# Patient Record
Sex: FEMALE | Race: WHITE | NOT HISPANIC OR LATINO | Employment: OTHER | URBAN - METROPOLITAN AREA
[De-identification: names, ages, dates, MRNs, and addresses within clinical notes are randomized per-mention and may not be internally consistent; named-entity substitution may affect disease eponyms.]

---

## 2018-04-10 ENCOUNTER — OFFICE VISIT (OUTPATIENT)
Dept: INTERNAL MEDICINE | Facility: CLINIC | Age: 83
End: 2018-04-10
Payer: MEDICARE

## 2018-04-10 VITALS
HEIGHT: 63 IN | SYSTOLIC BLOOD PRESSURE: 116 MMHG | DIASTOLIC BLOOD PRESSURE: 70 MMHG | TEMPERATURE: 99 F | OXYGEN SATURATION: 97 % | WEIGHT: 102 LBS | BODY MASS INDEX: 18.07 KG/M2 | HEART RATE: 68 BPM | RESPIRATION RATE: 18 BRPM

## 2018-04-10 DIAGNOSIS — M40.14 OTHER SECONDARY KYPHOSIS, THORACIC REGION: Primary | ICD-10-CM

## 2018-04-10 DIAGNOSIS — I70.90 ATHEROSCLEROSIS: ICD-10-CM

## 2018-04-10 DIAGNOSIS — Z13.220 SCREENING FOR HYPERLIPIDEMIA: ICD-10-CM

## 2018-04-10 DIAGNOSIS — R41.3 MEMORY LOSS: ICD-10-CM

## 2018-04-10 DIAGNOSIS — R19.7 DIARRHEA, UNSPECIFIED TYPE: ICD-10-CM

## 2018-04-10 DIAGNOSIS — Z85.038 HISTORY OF COLON CANCER: ICD-10-CM

## 2018-04-10 DIAGNOSIS — M16.11 PRIMARY OSTEOARTHRITIS OF RIGHT HIP: ICD-10-CM

## 2018-04-10 DIAGNOSIS — Z87.19 HISTORY OF INTESTINAL OBSTRUCTION: ICD-10-CM

## 2018-04-10 DIAGNOSIS — M40.204 KYPHOSIS OF THORACIC REGION, UNSPECIFIED KYPHOSIS TYPE: ICD-10-CM

## 2018-04-10 DIAGNOSIS — N39.45 CONTINUOUS LEAKAGE OF URINE: ICD-10-CM

## 2018-04-10 PROCEDURE — 99204 OFFICE O/P NEW MOD 45 MIN: CPT | Mod: ,,, | Performed by: INTERNAL MEDICINE

## 2018-04-10 RX ORDER — DIPHENOXYLATE HYDROCHLORIDE AND ATROPINE SULFATE 2.5; .025 MG/1; MG/1
1 TABLET ORAL 4 TIMES DAILY PRN
Qty: 60 TABLET | Refills: 0 | Status: SHIPPED | OUTPATIENT
Start: 2018-04-10 | End: 2019-06-10 | Stop reason: SDUPTHER

## 2018-04-10 RX ORDER — DIPHENOXYLATE HYDROCHLORIDE AND ATROPINE SULFATE 2.5; .025 MG/1; MG/1
1 TABLET ORAL 4 TIMES DAILY PRN
COMMUNITY
End: 2018-04-10 | Stop reason: SDUPTHER

## 2018-04-10 RX ORDER — DONEPEZIL HYDROCHLORIDE 5 MG/1
5 TABLET, FILM COATED ORAL NIGHTLY
Qty: 30 TABLET | Refills: 5 | Status: SHIPPED | OUTPATIENT
Start: 2018-04-10 | End: 2019-06-07 | Stop reason: SDUPTHER

## 2018-04-10 NOTE — PROGRESS NOTES
SUBJECTIVE:    Patient ID: Morena Mcclendon is a 84 y.o. female.    Chief Complaint: Establish Care (memory loss and a little confusion)    HPI     Patient comes to establish--she is post colon cancer and is being followed by a MD in [place]-colon cancer 2005--had a bowel obstruction in 2011--also a squamous GYN tumor 2014--last saw the Heme-Onc about 4 months ago    Establishing for well check type exam    Past Medical History:   Diagnosis Date    Anemia     Cancer     colon, removed all but 12 in    Cataract     Dementia      Social History     Social History    Marital status:      Spouse name: N/A    Number of children: N/A    Years of education: N/A     Occupational History    Not on file.     Social History Main Topics    Smoking status: Never Smoker    Smokeless tobacco: Never Used    Alcohol use 0.6 oz/week     1 Glasses of wine per week      Comment: daily    Drug use: No    Sexual activity: Not Currently     Other Topics Concern    Not on file     Social History Narrative    No narrative on file     Past Surgical History:   Procedure Laterality Date    APPENDECTOMY      COLON SURGERY      EYE SURGERY      HERNIA REPAIR      HYSTERECTOMY      tumor removed  2014     Family History   Problem Relation Age of Onset    Arthritis Mother     Depression Mother     Emphysema Father     Hearing loss Maternal Grandmother        Review of Systems   Constitutional: Negative for appetite change, chills, diaphoresis, fatigue, fever and unexpected weight change.        Appetite stable   HENT: Negative for congestion, ear pain, hearing loss, nosebleeds, postnasal drip, sinus pain, sinus pressure, sneezing, sore throat, tinnitus, trouble swallowing and voice change.    Eyes: Negative for photophobia, pain, itching and visual disturbance.        Up to date-hx cataract surgery   Respiratory: Negative for apnea, cough, chest tightness, shortness of breath, wheezing and stridor.     Cardiovascular: Negative for chest pain, palpitations and leg swelling.   Gastrointestinal: Negative for abdominal distention, abdominal pain, blood in stool, constipation, diarrhea (chronic diarrhea-some tenesmus-depends ), nausea and vomiting.   Endocrine: Negative for cold intolerance, heat intolerance, polydipsia and polyuria.   Genitourinary: Negative for difficulty urinating, dyspareunia, dysuria, flank pain, frequency, hematuria, menstrual problem, pelvic pain, urgency, vaginal discharge and vaginal pain.        Incontinence constantly   Musculoskeletal: Negative for arthralgias (lower posterior buttock pain mostly in the ma), back pain, joint swelling, myalgias, neck pain and neck stiffness.        No definite hx of ostreoporosis   Skin: Negative for pallor.   Allergic/Immunologic: Negative for environmental allergies and food allergies.   Neurological: Negative for dizziness, tremors, speech difficulty, weakness, light-headedness and numbness.        Memory-definite problem-ok from day to day-continues to drive   Hematological: Does not bruise/bleed easily.   Psychiatric/Behavioral: Negative for agitation, confusion, decreased concentration, sleep disturbance and suicidal ideas. The patient is not nervous/anxious.           Objective:      Physical Exam   Constitutional: She is oriented to person, place, and time. She appears well-developed and well-nourished. She is cooperative. No distress.   HENT:   Head: Normocephalic and atraumatic.   Right Ear: Tympanic membrane normal.   Left Ear: Tympanic membrane normal.   Mouth/Throat: Uvula is midline and mucous membranes are normal.   Eyes: Conjunctivae, EOM and lids are normal. Pupils are equal, round, and reactive to light. Right pupil is round and reactive. Left pupil is round and reactive.   Neck: Trachea normal and normal range of motion. Neck supple. No JVD present. No thyromegaly present.   Cardiovascular: Normal rate, regular rhythm, normal heart  sounds and intact distal pulses.    Pulmonary/Chest: Effort normal and breath sounds normal. No tachypnea. No respiratory distress.   Abdominal: Soft. Bowel sounds are normal. There is no tenderness.   Musculoskeletal: Normal range of motion.   Kyphosis of the thoracic spine   Lymphadenopathy:     She has no cervical adenopathy.   Neurological: She is alert and oriented to person, place, and time. She has normal strength.   Skin: Skin is warm and dry. No rash noted.   Psychiatric: She has a normal mood and affect. Her speech is normal.   Nursing note and vitals reviewed.          Assessment:       1. Other secondary kyphosis, thoracic region    2. Primary osteoarthritis of right hip    3. Kyphosis of thoracic region, unspecified kyphosis type    4. Memory loss    5. Diarrhea, unspecified type    6. Continuous leakage of urine    7. History of colon cancer    8. History of intestinal obstruction    9. Atherosclerosis    10. Screening for hyperlipidemia         Plan:           Other secondary kyphosis, thoracic region    Primary osteoarthritis of right hip  -     CBC auto differential; Future; Expected date: 04/11/2018  -     Comprehensive metabolic panel; Future; Expected date: 04/11/2018  -     X-Ray Hips Bilateral 2 View Inc AP Pelvis    Kyphosis of thoracic region, unspecified kyphosis type  -     X-Ray Chest PA And Lateral    Memory loss    Diarrhea, unspecified type  -     Occult blood x 1, stool; Future; Expected date: 04/11/2018  -     diphenoxylate-atropine 2.5-0.025 mg (LOMOTIL) 2.5-0.025 mg per tablet; Take 1 tablet by mouth 4 (four) times daily as needed for Diarrhea.  Dispense: 60 tablet; Refill: 0    Continuous leakage of urine  -     POCT Urinalysis, Dipstick, Automated, W/O Scope    History of colon cancer  -     CBC auto differential; Future; Expected date: 04/11/2018  -     Comprehensive metabolic panel; Future; Expected date: 04/11/2018  -     X-Ray Chest PA And Lateral    History of intestinal  obstruction  -     CBC auto differential; Future; Expected date: 04/11/2018  -     Comprehensive metabolic panel; Future; Expected date: 04/11/2018    Atherosclerosis  -     Lipid panel; Future; Expected date: 04/11/2018    Screening for hyperlipidemia    Other orders  -     Cancel: CEA; Future; Expected date: 04/09/2018  -     Cancel: CT Abdomen Pelvis W Wo Contrast  -     donepezil (ARICEPT) 5 MG tablet; Take 1 tablet (5 mg total) by mouth every evening.  Dispense: 30 tablet; Refill: 5

## 2018-04-10 NOTE — PATIENT INSTRUCTIONS
Alzheimer Disease  Alzheimer disease is a brain illness that can happen usually in older adults, but it can also happen as early as age 40. It is the most common cause of dementia. It is a progressive disease. This means it gets worse over time.  What is Alzheimer disease?  Alzheimer disease causes a series of changes to nerves of the brain. Some nerves form into clumps and tangles, and lose some of their connections to other nerves.  Healthcare providers dont fully understand what causes Alzheimer disease. But they think these may be some of the causes:  · Age and family history  · Certain genes  · Abnormal protein deposits in the brain  · Environmental factors  · Problems with a persons immune system  · Possibly infections  Symptoms of Alzheimer disease  The disease causes changes in behavior and thinking known as dementia. The symptoms include:  · Memory loss  · Confusion  · Restlessness  · Personality and behavior changes  · Problems with judgment  · Problems communicating with others  · Inability to follow directions  · Lack of emotion  Diagnosing Alzheimer disease  No single test is able to diagnose Alzheimer disease. Instead healthcare providers use a series of tests to rule out other health conditions. The tests may include:  · A complete medical history. This may include questions about overall health and past health problems. The healthcare provider may ask how well the person can do daily tasks. The healthcare provider may ask family or close friends about any changes in behavior or personality.  · Mental status test. This is a test of memory, problem solving, attention, counting, and language.   · Standard medical tests. These may include blood and urine tests to find possible causes for the problem.  · Brain imaging tests.  CT, MRI, or positron emission tomography (PET) may be used to rule out other causes of the problem.  Treating Alzheimer disease  Alzheimer disease has no cure. Instead healthcare  providers can help ease some symptoms. This can make a person with Alzheimer more comfortable. Treatment can also make it easier for their caregivers to take care of them.  Some medicines may help slow the decline of a persons memory, thinking, and language skills. They may help with problems of behavior, such as aggression. They can lessen hallucinations and delusions. These medicines can work for some but not all people. And they may help for only a limited time. Medicines include:  · Cholinesterase inhibitors  · Donepezil  · Galantamine  · Rivastigmine  · Memantine  In some cases, behavior problems can be caused by medicine side effects. Talk with the persons healthcare provider about all medicines he or she is taking.  Keeping healthy  For a person with Alzheimer, its important to stay healthy. Good nutrition and physical and social activity are vital. A calm and well-structured environment will help. Make sure to keep up with healthcare appointments and managing other health conditions, such as diabetes. Some patients benefit from having a nutritionist help to prevent weight loss.    Caring for someone with Alzheimer  A person with Alzheimer will need more caregiving over time. Talk with your healthcare provider about caregiving resources.   Date Last Reviewed: 11/12/2015  © 3410-6228 [employer]. [address] [place] [address]. All rights reserved. This information is not intended as a substitute for professional medical care. Always follow your healthcare professional's instructions.        Alzheimer Disease  Alzheimer disease is a brain illness that can happen usually in older adults, but it can also happen as early as age 40. It is the most common cause of dementia. It is a progressive disease. This means it gets worse over time.  What is Alzheimer disease?  Alzheimer disease causes a series of changes to nerves of the brain. Some nerves form into clumps and tangles, and lose  some of their connections to other nerves.  Healthcare providers dont fully understand what causes Alzheimer disease. But they think these may be some of the causes:  · Age and family history  · Certain genes  · Abnormal protein deposits in the brain  · Environmental factors  · Problems with a persons immune system  · Possibly infections  Symptoms of Alzheimer disease  The disease causes changes in behavior and thinking known as dementia. The symptoms include:  · Memory loss  · Confusion  · Restlessness  · Personality and behavior changes  · Problems with judgment  · Problems communicating with others  · Inability to follow directions  · Lack of emotion  Diagnosing Alzheimer disease  No single test is able to diagnose Alzheimer disease. Instead healthcare providers use a series of tests to rule out other health conditions. The tests may include:  · A complete medical history. This may include questions about overall health and past health problems. The healthcare provider may ask how well the person can do daily tasks. The healthcare provider may ask family or close friends about any changes in behavior or personality.  · Mental status test. This is a test of memory, problem solving, attention, counting, and language.   · Standard medical tests. These may include blood and urine tests to find possible causes for the problem.  · Brain imaging tests.  CT, MRI, or positron emission tomography (PET) may be used to rule out other causes of the problem.  Treating Alzheimer disease  Alzheimer disease has no cure. Instead healthcare providers can help ease some symptoms. This can make a person with Alzheimer more comfortable. Treatment can also make it easier for their caregivers to take care of them.  Some medicines may help slow the decline of a persons memory, thinking, and language skills. They may help with problems of behavior, such as aggression. They can lessen hallucinations and delusions. These medicines can  work for some but not all people. And they may help for only a limited time. Medicines include:  · Cholinesterase inhibitors  · Donepezil  · Galantamine  · Rivastigmine  · Memantine  In some cases, behavior problems can be caused by medicine side effects. Talk with the persons healthcare provider about all medicines he or she is taking.  Keeping healthy  For a person with Alzheimer, its important to stay healthy. Good nutrition and physical and social activity are vital. A calm and well-structured environment will help. Make sure to keep up with healthcare appointments and managing other health conditions, such as diabetes. Some patients benefit from having a nutritionist help to prevent weight loss.    Caring for someone with Alzheimer  A person with Alzheimer will need more caregiving over time. Talk with your healthcare provider about caregiving resources.   Date Last Reviewed: 11/12/2015  © 1379-8964 [employer]. [address] [place] [address], [place] [address]. All rights reserved. This information is not intended as a substitute for professional medical care. Always follow your healthcare professional's instructions.

## 2019-06-07 RX ORDER — DONEPEZIL HYDROCHLORIDE 5 MG/1
5 TABLET, FILM COATED ORAL NIGHTLY
Qty: 30 TABLET | Refills: 5 | OUTPATIENT
Start: 2019-06-07 | End: 2019-06-10 | Stop reason: SDUPTHER

## 2019-06-10 ENCOUNTER — OFFICE VISIT (OUTPATIENT)
Dept: FAMILY MEDICINE | Facility: CLINIC | Age: 84
End: 2019-06-10
Payer: MEDICARE

## 2019-06-10 VITALS
SYSTOLIC BLOOD PRESSURE: 130 MMHG | HEART RATE: 68 BPM | TEMPERATURE: 99 F | DIASTOLIC BLOOD PRESSURE: 62 MMHG | WEIGHT: 100 LBS | RESPIRATION RATE: 12 BRPM | BODY MASS INDEX: 17.72 KG/M2 | OXYGEN SATURATION: 97 % | HEIGHT: 63 IN

## 2019-06-10 DIAGNOSIS — R41.0 CONFUSION: ICD-10-CM

## 2019-06-10 DIAGNOSIS — I83.11 VARICOSE VEINS OF RIGHT LOWER EXTREMITY WITH INFLAMMATION: ICD-10-CM

## 2019-06-10 DIAGNOSIS — R19.7 DIARRHEA, UNSPECIFIED TYPE: ICD-10-CM

## 2019-06-10 DIAGNOSIS — L03.115 CELLULITIS OF RIGHT LOWER LEG: Primary | ICD-10-CM

## 2019-06-10 DIAGNOSIS — R06.89 ABNORMAL BREATH SOUNDS: ICD-10-CM

## 2019-06-10 DIAGNOSIS — R60.0 LOWER LEG EDEMA: ICD-10-CM

## 2019-06-10 DIAGNOSIS — R41.3 MEMORY LOSS: ICD-10-CM

## 2019-06-10 PROCEDURE — 99214 OFFICE O/P EST MOD 30 MIN: CPT | Mod: ,,, | Performed by: INTERNAL MEDICINE

## 2019-06-10 PROCEDURE — 99214 PR OFFICE/OUTPT VISIT, EST, LEVL IV, 30-39 MIN: ICD-10-PCS | Mod: ,,, | Performed by: INTERNAL MEDICINE

## 2019-06-10 RX ORDER — DOXYCYCLINE 100 MG/1
100 CAPSULE ORAL DAILY
Qty: 10 CAPSULE | Refills: 0 | Status: SHIPPED | OUTPATIENT
Start: 2019-06-10 | End: 2019-06-17

## 2019-06-10 RX ORDER — DONEPEZIL HYDROCHLORIDE 5 MG/1
5 TABLET, FILM COATED ORAL NIGHTLY
Qty: 90 TABLET | Refills: 2 | Status: SHIPPED | OUTPATIENT
Start: 2019-06-10 | End: 2019-07-15 | Stop reason: SDUPTHER

## 2019-06-10 RX ORDER — DIPHENOXYLATE HYDROCHLORIDE AND ATROPINE SULFATE 2.5; .025 MG/1; MG/1
1 TABLET ORAL 4 TIMES DAILY PRN
Qty: 120 TABLET | Refills: 2 | Status: SHIPPED | OUTPATIENT
Start: 2019-06-10 | End: 2021-03-08 | Stop reason: SDUPTHER

## 2019-06-10 RX ORDER — DONEPEZIL HYDROCHLORIDE 5 MG/1
5 TABLET, FILM COATED ORAL NIGHTLY
Qty: 30 TABLET | Refills: 5 | OUTPATIENT
Start: 2019-06-10

## 2019-06-10 NOTE — PROGRESS NOTES
SUBJECTIVE:    Patient ID: Morena Mcclendon is a 85 y.o. female.    Chief Complaint: Leg Swelling (right, possible cellulitis)    HPI    Patient comes in for edema of the right lower leg--she has had a dx of swelling of the lower right leg for years now but in the last week she has noted erythema and some heat in the leg-there has been a flea problem in the house and she may have been bitten by same-she has no discomfort in the leg-thus far she has not taken her son's advice to keep the leg elevated-she has had no fever-she denies any calf pain-       No visits with results within 6 Month(s) from this visit.   Latest known visit with results is:   No results found for any previous visit.       Past Medical History:   Diagnosis Date    Anemia     Cancer     colon, removed all but 12 in    Cataract     Dementia      Past Surgical History:   Procedure Laterality Date    APPENDECTOMY      COLON SURGERY      EYE SURGERY      HERNIA REPAIR      HYSTERECTOMY      tumor removed  2014     Family History   Problem Relation Age of Onset    Arthritis Mother     Depression Mother     Emphysema Father     Hearing loss Maternal Grandmother        Marital Status:   Alcohol History:  reports that she drinks about 0.6 oz of alcohol per week.  Tobacco History:  reports that she has never smoked. She has never used smokeless tobacco.  Drug History:  reports that she does not use drugs.    Review of patient's allergies indicates:  No Known Allergies    Current Outpatient Medications:     diphenoxylate-atropine 2.5-0.025 mg (LOMOTIL) 2.5-0.025 mg per tablet, Take 1 tablet by mouth 4 (four) times daily as needed for Diarrhea., Disp: 120 tablet, Rfl: 2    donepezil (ARICEPT) 5 MG tablet, Take 1 tablet (5 mg total) by mouth every evening., Disp: 90 tablet, Rfl: 2    Lactobacillus rhamnosus GG (CULTURELLE) 10 billion cell capsule, Take 1 capsule by mouth once daily., Disp: , Rfl:     multivitamin with minerals  "tablet, Take 1 tablet by mouth once daily., Disp: , Rfl:     vit C/E/Zn/coppr/lutein/zeaxan (PRESERVISION AREDS-2 ORAL), Take 1 tablet by mouth once daily., Disp: , Rfl:     doxycycline (VIBRAMYCIN) 100 MG Cap, Take 1 capsule (100 mg total) by mouth once daily. Take with food, Disp: 10 capsule, Rfl: 0    Review of Systems   Constitutional: Negative for chills, fatigue, fever and unexpected weight change.   HENT: Negative for congestion, ear pain, hearing loss, sinus pain and sore throat.    Eyes: Negative for pain and visual disturbance.   Respiratory: Negative for cough, shortness of breath and wheezing.    Cardiovascular: Negative for chest pain, palpitations and leg swelling.   Gastrointestinal: Negative for abdominal pain, blood in stool, constipation, diarrhea, nausea and vomiting.   Endocrine: Negative for cold intolerance and heat intolerance.   Genitourinary: Negative for difficulty urinating, dysuria, frequency, pelvic pain and urgency.   Musculoskeletal: Negative for back pain, joint swelling and neck pain.   Skin: Negative for pallor and rash.   Neurological: Negative for dizziness, tremors, weakness, numbness and headaches.        Memory loss   Hematological: Does not bruise/bleed easily.   Psychiatric/Behavioral: Negative for agitation, sleep disturbance and suicidal ideas.          Objective:      Vitals:    06/10/19 1504   BP: 130/62   Pulse: 68   Resp: 12   Temp: 98.6 °F (37 °C)   SpO2: 97%   Weight: 45.4 kg (100 lb)   Height: 5' 3" (1.6 m)     Physical Exam   Constitutional: She appears well-developed and well-nourished. She is cooperative.   HENT:   Right Ear: Tympanic membrane normal.   Left Ear: Tympanic membrane normal.   Eyes: Conjunctivae, EOM and lids are normal. Right pupil is round and reactive. Left pupil is round and reactive.   Neck: Trachea normal and normal range of motion. Neck supple. No JVD present. Carotid bruit is not present. No thyromegaly present.   Cardiovascular: Normal " rate, regular rhythm, S1 normal, S2 normal, normal heart sounds and intact distal pulses. Exam reveals no gallop and no friction rub.   No murmur heard.  Pulmonary/Chest: Breath sounds normal. No respiratory distress. She has no wheezes. She has no rales.   Abdominal: Soft. She exhibits no mass. There is no tenderness. There is no rigidity and no guarding.   Decreased BS upper posterior left  lung    Musculoskeletal: She exhibits no edema.   There is edema of the lower right leg with some erythema but no heat-there are several interruption of the skin integrity and some doughy sensation on palpation of the posterior calf-negative homans sign and no tenderness-the edema is 2+ and pitting-evidence of some varicosities of the affectred extremity   Neurological: She exhibits normal muscle tone.   A little distant-not really able to answer questions directly-son offers same   Skin: Skin is warm and dry. Capillary refill takes less than 2 seconds. No lesion and no rash noted. Nails show no clubbing.   Psychiatric: She has a normal mood and affect. Her behavior is normal.   Nursing note and vitals reviewed.        Assessment:       1. Cellulitis of right lower leg    2. Lower leg edema    3. Varicose veins of right lower extremity with inflammation    4. Diarrhea, unspecified type    5. Memory loss    6. Confusion    7. Abnormal breath sounds         Plan:       Cellulitis of right lower leg  -     CBC auto differential; Future; Expected date: 06/17/2019  -     Comprehensive metabolic panel; Future; Expected date: 06/10/2019  -     doxycycline (VIBRAMYCIN) 100 MG Cap; Take 1 capsule (100 mg total) by mouth once daily. Take with food  Dispense: 10 capsule; Refill: 0  -     CV Ultrasound doppler venous DVT leg right; Future    Lower leg edema  -     CV Ultrasound doppler venous DVT leg right; Future    Varicose veins of right lower extremity with inflammation    Diarrhea, unspecified type  -     diphenoxylate-atropine  2.5-0.025 mg (LOMOTIL) 2.5-0.025 mg per tablet; Take 1 tablet by mouth 4 (four) times daily as needed for Diarrhea.  Dispense: 120 tablet; Refill: 2    Memory loss  -     donepezil (ARICEPT) 5 MG tablet; Take 1 tablet (5 mg total) by mouth every evening.  Dispense: 90 tablet; Refill: 2    Confusion  -     Urinalysis Microscopic; Future; Expected date: 06/11/2019    Abnormal breath sounds  -     X-Ray Chest PA And Lateral; Future; Expected date: 06/10/2019      Follow up in about 1 week (around 6/17/2019) for leg re-eval.        6/10/2019 Faith Nichole M.D.

## 2019-06-17 ENCOUNTER — OFFICE VISIT (OUTPATIENT)
Dept: FAMILY MEDICINE | Facility: CLINIC | Age: 84
End: 2019-06-17
Payer: MEDICARE

## 2019-06-17 VITALS
HEART RATE: 66 BPM | OXYGEN SATURATION: 95 % | DIASTOLIC BLOOD PRESSURE: 78 MMHG | TEMPERATURE: 98 F | BODY MASS INDEX: 17.72 KG/M2 | WEIGHT: 100 LBS | SYSTOLIC BLOOD PRESSURE: 136 MMHG | HEIGHT: 63 IN | RESPIRATION RATE: 10 BRPM

## 2019-06-17 DIAGNOSIS — R60.0 EDEMA OF RIGHT LOWER LEG DUE TO PERIPHERAL VENOUS INSUFFICIENCY: ICD-10-CM

## 2019-06-17 DIAGNOSIS — I87.2 EDEMA OF RIGHT LOWER LEG DUE TO PERIPHERAL VENOUS INSUFFICIENCY: ICD-10-CM

## 2019-06-17 DIAGNOSIS — L03.115 CELLULITIS OF RIGHT LOWER LEG: ICD-10-CM

## 2019-06-17 DIAGNOSIS — I87.2 VENOUS INSUFFICIENCY OF RIGHT LEG: Primary | ICD-10-CM

## 2019-06-17 PROCEDURE — 99213 PR OFFICE/OUTPT VISIT, EST, LEVL III, 20-29 MIN: ICD-10-PCS | Mod: ,,, | Performed by: INTERNAL MEDICINE

## 2019-06-17 PROCEDURE — 99213 OFFICE O/P EST LOW 20 MIN: CPT | Mod: ,,, | Performed by: INTERNAL MEDICINE

## 2019-06-17 RX ORDER — LEVOFLOXACIN 250 MG/1
250 TABLET ORAL DAILY
Qty: 10 TABLET | Refills: 0 | Status: SHIPPED | OUTPATIENT
Start: 2019-06-17 | End: 2019-07-15

## 2019-06-17 RX ORDER — SULFAMETHOXAZOLE AND TRIMETHOPRIM 800; 160 MG/1; MG/1
1 TABLET ORAL DAILY
Qty: 10 TABLET | Refills: 0 | Status: SHIPPED | OUTPATIENT
Start: 2019-06-17 | End: 2019-07-15

## 2019-06-17 NOTE — PROGRESS NOTES
SUBJECTIVE:    Patient ID: Morena Mcclendon is a 85 y.o. female.    Chief Complaint: Leg Swelling and Follow-up    HPI     Patient comes in for follow-up for edema of the right lower leg--she has had a dx of swelling of the lower right leg for years now but in the last week she has noted erythema and some heat in the leg-there has been a flea problem in the house and she may have been bitten by same-she has no discomfort in the leg-thus far she has not taken her son's advice to keep the leg elevated-she has had no fever-she denies any calf pain-  she was evaluated last office visit and placed on doxycycline 100 mg every day for cellulitis, and DVT scan was negative. There were noted varicosities of the lower extremities.She had no response to the doxycycline and the arthur is erythematous and remains with swelling ( right)    Laboratory studies were ordered but not seen as completed.    No visits with results within 6 Month(s) from this visit.   Latest known visit with results is:   No results found for any previous visit.       Past Medical History:   Diagnosis Date    Anemia     Cancer     colon, removed all but 12 in    Cataract     Dementia      Past Surgical History:   Procedure Laterality Date    APPENDECTOMY      COLON SURGERY      EYE SURGERY      HERNIA REPAIR      HYSTERECTOMY      tumor removed  2014     Family History   Problem Relation Age of Onset    Arthritis Mother     Depression Mother     Emphysema Father     Hearing loss Maternal Grandmother        Marital Status:   Alcohol History:  reports that she drinks about 0.6 oz of alcohol per week.  Tobacco History:  reports that she has never smoked. She has never used smokeless tobacco.  Drug History:  reports that she does not use drugs.    Review of patient's allergies indicates:  No Known Allergies    Current Outpatient Medications:     diphenoxylate-atropine 2.5-0.025 mg (LOMOTIL) 2.5-0.025 mg per tablet, Take 1 tablet by mouth  "4 (four) times daily as needed for Diarrhea., Disp: 120 tablet, Rfl: 2    donepezil (ARICEPT) 5 MG tablet, Take 1 tablet (5 mg total) by mouth every evening., Disp: 90 tablet, Rfl: 2    Lactobacillus rhamnosus GG (CULTURELLE) 10 billion cell capsule, Take 1 capsule by mouth once daily., Disp: , Rfl:     multivitamin with minerals tablet, Take 1 tablet by mouth once daily., Disp: , Rfl:     vit C/E/Zn/coppr/lutein/zeaxan (PRESERVISION AREDS-2 ORAL), Take 1 tablet by mouth once daily., Disp: , Rfl:     levoFLOXacin (LEVAQUIN) 250 MG tablet, Take 1 tablet (250 mg total) by mouth once daily., Disp: 10 tablet, Rfl: 0    sulfamethoxazole-trimethoprim 800-160mg (BACTRIM DS) 800-160 mg Tab, Take 1 tablet by mouth once daily., Disp: 10 tablet, Rfl: 0    Review of Systems   All other systems reviewed and are negative.         Objective:      Vitals:    06/17/19 1313   BP: 136/78   Pulse: 66   Resp: 10   Temp: 98.3 °F (36.8 °C)   SpO2: 95%   Weight: 45.4 kg (100 lb)   Height: 5' 3" (1.6 m)     Physical Exam   Constitutional: She is oriented to person, place, and time. She appears well-developed and well-nourished. She is cooperative.   HENT:   Right Ear: Tympanic membrane normal.   Left Ear: Tympanic membrane normal.   Eyes: Conjunctivae, EOM and lids are normal. Right pupil is round and reactive. Left pupil is round and reactive.   Neck: Trachea normal and normal range of motion. Neck supple. No JVD present. Carotid bruit is not present. No thyromegaly present.   Cardiovascular: Normal rate, regular rhythm, S1 normal, S2 normal, normal heart sounds and intact distal pulses. Exam reveals no gallop and no friction rub.   No murmur heard.  Pulmonary/Chest: Breath sounds normal. No respiratory distress. She has no wheezes. She has no rales.   Abdominal: Soft. Bowel sounds are normal. She exhibits no mass. There is no tenderness. There is no rigidity and no guarding.   Musculoskeletal: She exhibits edema (circumferential " edema of the lower right leg).   Neurological: She is alert and oriented to person, place, and time.   Skin: Skin is warm and dry. Capillary refill takes less than 2 seconds. No lesion and no rash noted. There is erythema (erythema qnd warmth of the lower right leg). Nails show no clubbing.   Psychiatric: She has a normal mood and affect. Her behavior is normal. Judgment and thought content normal.   Nursing note and vitals reviewed.        Assessment:       1. Venous insufficiency of right leg    2. Edema of right lower leg due to peripheral venous insufficiency    3. Cellulitis of right lower leg         Plan:       Venous insufficiency of right leg        -     See below    Edema of right lower leg due to peripheral venous insufficiency        -     Compression stocking to be evaluated and applied by home health    Cellulitis of right lower leg        -     levaquin 250 mg a day plus bactrim DS one a day for ten days-I do not want this to worsen and create breaks in the skin-those that were there before are no longer present        -     Referral to [facility] home health to follow this present illness and to draw necessary blood and to measure and recommend for compression stocking and see that she wears it and also elevates the legs      Follow up in about 3 weeks (around 7/8/2019) for leg, FOLLOW UP LABS.        6/17/2019 Faith Nichole M.D.

## 2019-07-15 ENCOUNTER — OFFICE VISIT (OUTPATIENT)
Dept: FAMILY MEDICINE | Facility: CLINIC | Age: 84
End: 2019-07-15
Payer: MEDICARE

## 2019-07-15 VITALS
RESPIRATION RATE: 12 BRPM | DIASTOLIC BLOOD PRESSURE: 64 MMHG | HEART RATE: 80 BPM | BODY MASS INDEX: 17.52 KG/M2 | SYSTOLIC BLOOD PRESSURE: 102 MMHG | TEMPERATURE: 98 F | OXYGEN SATURATION: 96 % | WEIGHT: 98.88 LBS | HEIGHT: 63 IN

## 2019-07-15 DIAGNOSIS — Z00.00 LABORATORY EXAM ORDERED AS PART OF ROUTINE GENERAL MEDICAL EXAMINATION: ICD-10-CM

## 2019-07-15 DIAGNOSIS — I87.2 VENOUS INSUFFICIENCY OF BOTH LOWER EXTREMITIES: Primary | ICD-10-CM

## 2019-07-15 DIAGNOSIS — R23.0 PERIPHERAL CYANOSIS: ICD-10-CM

## 2019-07-15 DIAGNOSIS — R60.0 LOCALIZED EDEMA: ICD-10-CM

## 2019-07-15 DIAGNOSIS — R41.3 MEMORY LOSS: ICD-10-CM

## 2019-07-15 DIAGNOSIS — R09.89 DECREASED PULSES IN FEET: ICD-10-CM

## 2019-07-15 DIAGNOSIS — Z00.00 ROUTINE GENERAL MEDICAL EXAMINATION AT A HEALTH CARE FACILITY: ICD-10-CM

## 2019-07-15 PROCEDURE — 99214 PR OFFICE/OUTPT VISIT, EST, LEVL IV, 30-39 MIN: ICD-10-PCS | Mod: ,,, | Performed by: INTERNAL MEDICINE

## 2019-07-15 PROCEDURE — 99214 OFFICE O/P EST MOD 30 MIN: CPT | Mod: ,,, | Performed by: INTERNAL MEDICINE

## 2019-07-15 RX ORDER — DONEPEZIL HYDROCHLORIDE 10 MG/1
10 TABLET, FILM COATED ORAL NIGHTLY
Qty: 90 TABLET | Refills: 1 | Status: SHIPPED | OUTPATIENT
Start: 2019-07-15 | End: 2020-03-07

## 2019-07-15 RX ORDER — MEMANTINE HYDROCHLORIDE 10 MG/1
10 TABLET ORAL 2 TIMES DAILY
Qty: 60 TABLET | Refills: 3 | Status: SHIPPED | OUTPATIENT
Start: 2019-11-15 | End: 2020-02-06

## 2019-07-15 RX ORDER — MEMANTINE HYDROCHLORIDE 5 MG/1
5 TABLET ORAL 2 TIMES DAILY
Qty: 30 TABLET | Refills: 0 | Status: SHIPPED | OUTPATIENT
Start: 2019-07-15 | End: 2020-02-06 | Stop reason: SDUPTHER

## 2019-07-15 RX ORDER — MEMANTINE HYDROCHLORIDE 5 MG/1
TABLET ORAL
Qty: 90 TABLET | Refills: 0 | Status: SHIPPED | OUTPATIENT
Start: 2019-09-15 | End: 2020-02-06 | Stop reason: SDUPTHER

## 2019-07-15 RX ORDER — MEMANTINE HYDROCHLORIDE 5 MG/1
TABLET ORAL
Qty: 120 TABLET | Refills: 0 | Status: SHIPPED | OUTPATIENT
Start: 2019-10-15 | End: 2019-07-16 | Stop reason: SDUPTHER

## 2019-07-15 RX ORDER — MEMANTINE HYDROCHLORIDE 5 MG/1
5 TABLET ORAL 2 TIMES DAILY
Qty: 60 TABLET | Refills: 0 | Status: SHIPPED | OUTPATIENT
Start: 2019-08-15 | End: 2020-02-06 | Stop reason: SDUPTHER

## 2019-07-15 NOTE — PROGRESS NOTES
SUBJECTIVE:    Patient ID: Morena Mcclendon is a 85 y.o. female.    Chief Complaint: Recurrent Skin Infections and Follow-up    HPI     85-year-old female in for follow-up of edema.    Pt has chronic venous insufficiency and comes for follow-up SHE HAD SOME VENOUS STASIS EARLY LESIONS THAT WERE BECOMING INFECTED AND SURROUNDED BY ERYTHEMA AND DIFFUSE SWELLING OF THE LOWER RIGHT LEG -her lesions have cleared up nicely and today there is no edema-she has no pain in the leg at all in the leg-    Memory issues-son wants to know about adding namenda if appropriate- Her aricept has been increased to 10 mg in preparation for addition of namenda-    No visits with results within 6 Month(s) from this visit.   Latest known visit with results is:   No results found for any previous visit.       Past Medical History:   Diagnosis Date    Anemia     Cancer     colon, removed all but 12 in    Cataract     Dementia      Past Surgical History:   Procedure Laterality Date    APPENDECTOMY      COLON SURGERY      EYE SURGERY      HERNIA REPAIR      HYSTERECTOMY      tumor removed  2014     Family History   Problem Relation Age of Onset    Arthritis Mother     Depression Mother     Emphysema Father     Hearing loss Maternal Grandmother        Marital Status:   Alcohol History:  reports that she drinks about 0.6 oz of alcohol per week.  Tobacco History:  reports that she has never smoked. She has never used smokeless tobacco.  Drug History:  reports that she does not use drugs.    Review of patient's allergies indicates:  No Known Allergies    Current Outpatient Medications:     diphenoxylate-atropine 2.5-0.025 mg (LOMOTIL) 2.5-0.025 mg per tablet, Take 1 tablet by mouth 4 (four) times daily as needed for Diarrhea., Disp: 120 tablet, Rfl: 2    donepezil (ARICEPT) 5 MG tablet, Take 1 tablet (5 mg total) by mouth every evening., Disp: 90 tablet, Rfl: 2    Lactobacillus rhamnosus GG (CULTURELLE) 10 billion cell  "capsule, Take 1 capsule by mouth once daily., Disp: , Rfl:     multivitamin with minerals tablet, Take 1 tablet by mouth once daily., Disp: , Rfl:     vit C/E/Zn/coppr/lutein/zeaxan (PRESERVISION AREDS-2 ORAL), Take 1 tablet by mouth once daily., Disp: , Rfl:     memantine (NAMENDA) 5 MG Tab, Take 1 tablet (5 mg total) by mouth 2 (two) times daily., Disp: 30 tablet, Rfl: 0    Review of Systems       Objective:      Vitals:    07/15/19 1442   BP: 102/64   Pulse: 80   Resp: 12   Temp: 98.4 °F (36.9 °C)   SpO2: 96%   Weight: 44.9 kg (98 lb 14.4 oz)   Height: 5' 3" (1.6 m)     Physical Exam      Assessment:       1. Venous insufficiency of both lower extremities    2. Localized edema    3. Peripheral cyanosis    4. Decreased pulses in feet    5. Memory loss    6. Laboratory exam ordered as part of routine general medical examination    7. Routine general medical examination at a health care facility         Plan:       Venous insufficiency of both lower extremities        -     Stable and improving    Localized edema  -     CBC auto differential; Future; Expected date: 07/22/2019  -     Comprehensive metabolic panel; Future; Expected date: 07/15/2019  -     Urinalysis Microscopic; Future; Expected date: 07/15/2019    Peripheral cyanosis  -     VAS US Arterial Legs Bilateral; Future; Expected date: 07/22/2019    Decreased pulses in feet        -     US Arterial Legs Bilateral    Memory loss        -     Begin Namenda 5 mg daily and increase monthly    Laboratory exam ordered as part of routine general medical examination    Routine general medical examination at a health care facility    Other orders  -     memantine (NAMENDA) 5 MG Tab; Take 1 tablet (5 mg total) by mouth 2 (two) times daily.  Dispense: 30 tablet; Refill: 0      Follow up in about 3 months (around 10/15/2019).        7/15/2019 Faith Nichole M.D.    "

## 2019-07-16 DIAGNOSIS — R41.3 MEMORY LOSS: ICD-10-CM

## 2019-07-16 RX ORDER — MEMANTINE HYDROCHLORIDE 5 MG/1
TABLET ORAL
Qty: 360 TABLET | Refills: 0 | Status: SHIPPED | OUTPATIENT
Start: 2019-07-16 | End: 2020-02-06 | Stop reason: SDUPTHER

## 2019-07-24 ENCOUNTER — TELEPHONE (OUTPATIENT)
Dept: FAMILY MEDICINE | Facility: CLINIC | Age: 84
End: 2019-07-24

## 2019-07-24 NOTE — TELEPHONE ENCOUNTER
----- Message from Faith Nichole MD sent at 7/19/2019 10:26 PM CDT -----  Test results are normal.I dont think there is any evidence of any problem with arterial blood flow

## 2019-08-27 ENCOUNTER — TELEPHONE (OUTPATIENT)
Dept: FAMILY MEDICINE | Facility: CLINIC | Age: 84
End: 2019-08-27

## 2019-09-11 ENCOUNTER — TELEPHONE (OUTPATIENT)
Dept: FAMILY MEDICINE | Facility: CLINIC | Age: 84
End: 2019-09-11

## 2020-01-31 ENCOUNTER — LAB VISIT (OUTPATIENT)
Dept: LAB | Facility: HOSPITAL | Age: 85
End: 2020-01-31
Attending: INTERNAL MEDICINE
Payer: MEDICARE

## 2020-01-31 DIAGNOSIS — R60.0 LOCALIZED EDEMA: Primary | ICD-10-CM

## 2020-01-31 LAB
ALBUMIN SERPL BCP-MCNC: 3.4 G/DL (ref 3.5–5.2)
ALP SERPL-CCNC: 72 U/L (ref 55–135)
ALT SERPL W/O P-5'-P-CCNC: 20 U/L (ref 10–44)
ANION GAP SERPL CALC-SCNC: 6 MMOL/L (ref 8–16)
AST SERPL-CCNC: 24 U/L (ref 10–40)
BASOPHILS # BLD AUTO: 0.02 K/UL (ref 0–0.2)
BASOPHILS NFR BLD: 0.3 % (ref 0–1.9)
BILIRUB SERPL-MCNC: 0.8 MG/DL (ref 0.1–1)
BILIRUB UR QL STRIP: NEGATIVE
BUN SERPL-MCNC: 20 MG/DL (ref 8–23)
CALCIUM SERPL-MCNC: 8.7 MG/DL (ref 8.7–10.5)
CHLORIDE SERPL-SCNC: 104 MMOL/L (ref 95–110)
CLARITY UR: CLEAR
CO2 SERPL-SCNC: 28 MMOL/L (ref 23–29)
COLOR UR: YELLOW
CREAT SERPL-MCNC: 1.3 MG/DL (ref 0.5–1.4)
DIFFERENTIAL METHOD: ABNORMAL
EOSINOPHIL # BLD AUTO: 0.3 K/UL (ref 0–0.5)
EOSINOPHIL NFR BLD: 4.4 % (ref 0–8)
ERYTHROCYTE [DISTWIDTH] IN BLOOD BY AUTOMATED COUNT: 13.8 % (ref 11.5–14.5)
EST. GFR  (AFRICAN AMERICAN): 42.9 ML/MIN/1.73 M^2
EST. GFR  (NON AFRICAN AMERICAN): 37.2 ML/MIN/1.73 M^2
GLUCOSE SERPL-MCNC: 94 MG/DL (ref 70–110)
GLUCOSE UR QL STRIP: NEGATIVE
HCT VFR BLD AUTO: 33.6 % (ref 37–48.5)
HGB BLD-MCNC: 10.4 G/DL (ref 12–16)
HGB UR QL STRIP: NEGATIVE
IMM GRANULOCYTES # BLD AUTO: 0.03 K/UL (ref 0–0.04)
IMM GRANULOCYTES NFR BLD AUTO: 0.4 % (ref 0–0.5)
KETONES UR QL STRIP: NEGATIVE
LEUKOCYTE ESTERASE UR QL STRIP: NEGATIVE
LYMPHOCYTES # BLD AUTO: 0.7 K/UL (ref 1–4.8)
LYMPHOCYTES NFR BLD: 9.5 % (ref 18–48)
MCH RBC QN AUTO: 29.2 PG (ref 27–31)
MCHC RBC AUTO-ENTMCNC: 31 G/DL (ref 32–36)
MCV RBC AUTO: 94 FL (ref 82–98)
MONOCYTES # BLD AUTO: 0.7 K/UL (ref 0.3–1)
MONOCYTES NFR BLD: 9.6 % (ref 4–15)
NEUTROPHILS # BLD AUTO: 5.2 K/UL (ref 1.8–7.7)
NEUTROPHILS NFR BLD: 75.8 % (ref 38–73)
NITRITE UR QL STRIP: NEGATIVE
NRBC BLD-RTO: 0 /100 WBC
PH UR STRIP: 6 [PH] (ref 5–8)
PLATELET # BLD AUTO: 195 K/UL (ref 150–350)
PMV BLD AUTO: 10.7 FL (ref 9.2–12.9)
POTASSIUM SERPL-SCNC: 4.9 MMOL/L (ref 3.5–5.1)
PROT SERPL-MCNC: 6.1 G/DL (ref 6–8.4)
PROT UR QL STRIP: ABNORMAL
RBC # BLD AUTO: 3.56 M/UL (ref 4–5.4)
SODIUM SERPL-SCNC: 138 MMOL/L (ref 136–145)
SP GR UR STRIP: 1.02 (ref 1–1.03)
URN SPEC COLLECT METH UR: ABNORMAL
UROBILINOGEN UR STRIP-ACNC: NEGATIVE EU/DL
WBC # BLD AUTO: 6.87 K/UL (ref 3.9–12.7)

## 2020-01-31 PROCEDURE — 80053 COMPREHEN METABOLIC PANEL: CPT

## 2020-01-31 PROCEDURE — 81003 URINALYSIS AUTO W/O SCOPE: CPT

## 2020-01-31 PROCEDURE — 85025 COMPLETE CBC W/AUTO DIFF WBC: CPT

## 2020-01-31 PROCEDURE — 36415 COLL VENOUS BLD VENIPUNCTURE: CPT

## 2020-02-06 ENCOUNTER — TELEPHONE (OUTPATIENT)
Dept: FAMILY MEDICINE | Facility: CLINIC | Age: 85
End: 2020-02-06

## 2020-02-06 ENCOUNTER — OFFICE VISIT (OUTPATIENT)
Dept: FAMILY MEDICINE | Facility: CLINIC | Age: 85
End: 2020-02-06
Payer: MEDICARE

## 2020-02-06 VITALS
HEIGHT: 63 IN | DIASTOLIC BLOOD PRESSURE: 64 MMHG | WEIGHT: 99 LBS | HEART RATE: 62 BPM | BODY MASS INDEX: 17.54 KG/M2 | SYSTOLIC BLOOD PRESSURE: 136 MMHG | TEMPERATURE: 98 F | RESPIRATION RATE: 16 BRPM

## 2020-02-06 DIAGNOSIS — R41.3 MEMORY LOSS: ICD-10-CM

## 2020-02-06 DIAGNOSIS — R60.0 LOCALIZED EDEMA: ICD-10-CM

## 2020-02-06 DIAGNOSIS — I87.2 VENOUS INSUFFICIENCY OF BOTH LOWER EXTREMITIES: Primary | ICD-10-CM

## 2020-02-06 PROCEDURE — 99213 OFFICE O/P EST LOW 20 MIN: CPT | Mod: S$PBB,,, | Performed by: INTERNAL MEDICINE

## 2020-02-06 PROCEDURE — 99214 OFFICE O/P EST MOD 30 MIN: CPT | Performed by: INTERNAL MEDICINE

## 2020-02-06 PROCEDURE — 99213 PR OFFICE/OUTPT VISIT, EST, LEVL III, 20-29 MIN: ICD-10-PCS | Mod: S$PBB,,, | Performed by: INTERNAL MEDICINE

## 2020-02-06 NOTE — PROGRESS NOTES
SUBJECTIVE:    Patient ID: Morena Mcclendon is a 86 y.o. female.    Chief Complaint: Results (labs) and Follow-up    HPI     Patient comes in for 3 month follow-up.  Her recent labs included sodium 138 potassium 4.9 glucose 94 BUN 20 creatinine 1.38 for a GFR 37 cc.  Hemoglobin 10.4 hematocrit 33.6 white blood cell count was 6.9 with 76% polys and lymphs 10%.    Her latest issues were memory and venous insufficiency--her son has been trying to have her repeat poems-also he has noted her condition is not any worse, but he states she has not been on the namenda, only the aricept (??-will have to review record to see if there was a phone call or some ill effect of that rx because we had her still taking it...)      Lab Visit on 01/31/2020   Component Date Value Ref Range Status    Specimen UA 01/31/2020 Urine, Clean Catch   Final    Color, UA 01/31/2020 Yellow  Yellow, Straw, [place] Final    Appearance, UA 01/31/2020 Clear  Clear Final    pH, UA 01/31/2020 6.0  5.0 - 8.0 Final    Specific [place], UA 01/31/2020 1.025  1.005 - 1.030 Final    Protein, UA 01/31/2020 Trace* Negative Final    Glucose, UA 01/31/2020 Negative  Negative Final    Ketones, UA 01/31/2020 Negative  Negative Final    Bilirubin (UA) 01/31/2020 Negative  Negative Final    Occult Blood UA 01/31/2020 Negative  Negative Final    Nitrite, UA 01/31/2020 Negative  Negative Final    Urobilinogen, UA 01/31/2020 Negative  Negative EU/dL Final    Leukocytes, UA 01/31/2020 Negative  Negative Final    Sodium 01/31/2020 138  136 - 145 mmol/L Final    Potassium 01/31/2020 4.9  3.5 - 5.1 mmol/L Final    Chloride 01/31/2020 104  95 - 110 mmol/L Final    CO2 01/31/2020 28  23 - 29 mmol/L Final    Glucose 01/31/2020 94  70 - 110 mg/dL Final    BUN, Bld 01/31/2020 20  8 - 23 mg/dL Final    Creatinine 01/31/2020 1.3  0.5 - 1.4 mg/dL Final    Calcium 01/31/2020 8.7  8.7 - 10.5 mg/dL Final    Total Protein 01/31/2020 6.1  6.0 - 8.4 g/dL Final     Albumin 01/31/2020 3.4* 3.5 - 5.2 g/dL Final    Total Bilirubin 01/31/2020 0.8  0.1 - 1.0 mg/dL Final    Alkaline Phosphatase 01/31/2020 72  55 - 135 U/L Final    AST 01/31/2020 24  10 - 40 U/L Final    ALT 01/31/2020 20  10 - 44 U/L Final    Anion Gap 01/31/2020 6* 8 - 16 mmol/L Final    eGFR if African American 01/31/2020 42.9* >60 mL/min/1.73 m^2 Final    eGFR if non African American 01/31/2020 37.2* >60 mL/min/1.73 m^2 Final    WBC 01/31/2020 6.87  3.90 - 12.70 K/uL Final    RBC 01/31/2020 3.56* 4.00 - 5.40 M/uL Final    Hemoglobin 01/31/2020 10.4* 12.0 - 16.0 g/dL Final    Hematocrit 01/31/2020 33.6* 37.0 - 48.5 % Final    Mean Corpuscular Volume 01/31/2020 94  82 - 98 fL Final    Mean Corpuscular Hemoglobin 01/31/2020 29.2  27.0 - 31.0 pg Final    Mean Corpuscular Hemoglobin Conc 01/31/2020 31.0* 32.0 - 36.0 g/dL Final    RDW 01/31/2020 13.8  11.5 - 14.5 % Final    Platelets 01/31/2020 195  150 - 350 K/uL Final    MPV 01/31/2020 10.7  9.2 - 12.9 fL Final    Immature Granulocytes 01/31/2020 0.4  0.0 - 0.5 % Final    Gran # (ANC) 01/31/2020 5.2  1.8 - 7.7 K/uL Final    Immature Grans (Abs) 01/31/2020 0.03  0.00 - 0.04 K/uL Final    Lymph # 01/31/2020 0.7* 1.0 - 4.8 K/uL Final    Mono # 01/31/2020 0.7  0.3 - 1.0 K/uL Final    Eos # 01/31/2020 0.3  0.0 - 0.5 K/uL Final    Baso # 01/31/2020 0.02  0.00 - 0.20 K/uL Final    nRBC 01/31/2020 0  0 /100 WBC Final    Gran% 01/31/2020 75.8* 38.0 - 73.0 % Final    Lymph% 01/31/2020 9.5* 18.0 - 48.0 % Final    Mono% 01/31/2020 9.6  4.0 - 15.0 % Final    Eosinophil% 01/31/2020 4.4  0.0 - 8.0 % Final    Basophil% 01/31/2020 0.3  0.0 - 1.9 % Final    Differential Method 01/31/2020 Automated   Final       Past Medical History:   Diagnosis Date    Anemia     Cancer     colon, removed all but 12 in    Cataract     Dementia      Past Surgical History:   Procedure Laterality Date    APPENDECTOMY      COLON SURGERY      EYE SURGERY      HERNIA  REPAIR      HYSTERECTOMY      tumor removed  2014     Family History   Problem Relation Age of Onset    Arthritis Mother     Depression Mother     Emphysema Father     Hearing loss Maternal Grandmother        Marital Status:   Alcohol History:  reports that she drinks about 1.0 standard drinks of alcohol per week.  Tobacco History:  reports that she has never smoked. She has never used smokeless tobacco.  Drug History:  reports that she does not use drugs.    Review of patient's allergies indicates:  No Known Allergies    Current Outpatient Medications:     diphenoxylate-atropine 2.5-0.025 mg (LOMOTIL) 2.5-0.025 mg per tablet, Take 1 tablet by mouth 4 (four) times daily as needed for Diarrhea., Disp: 120 tablet, Rfl: 2    donepezil (ARICEPT) 10 MG tablet, Take 1 tablet (10 mg total) by mouth every evening., Disp: 90 tablet, Rfl: 1    Lactobacillus rhamnosus GG (CULTURELLE) 10 billion cell capsule, Take 1 capsule by mouth once daily., Disp: , Rfl:     multivitamin with minerals tablet, Take 1 tablet by mouth once daily., Disp: , Rfl:     vit C/E/Zn/coppr/lutein/zeaxan (PRESERVISION AREDS-2 ORAL), Take 1 tablet by mouth once daily., Disp: , Rfl:     Review of Systems   Constitutional: Negative for chills, fatigue, fever and unexpected weight change.   HENT: Negative for congestion, ear pain, hearing loss, sinus pain and sore throat.    Eyes: Negative for pain and visual disturbance.   Respiratory: Negative for cough, shortness of breath and wheezing.    Cardiovascular: Negative for chest pain, palpitations and leg swelling.   Gastrointestinal: Negative for abdominal pain, blood in stool, constipation, diarrhea, nausea and vomiting.   Endocrine: Negative for cold intolerance and heat intolerance.   Genitourinary: Negative for difficulty urinating (wears depends), dysuria, frequency, pelvic pain and urgency.   Musculoskeletal: Positive for arthralgias (right hip). Negative for back pain, joint swelling  "and neck pain.   Skin: Negative for pallor and rash.   Neurological: Negative for dizziness, tremors, weakness, numbness and headaches.   Hematological: Does not bruise/bleed easily.   Psychiatric/Behavioral: Positive for confusion (decreased ST memory). Negative for agitation, sleep disturbance and suicidal ideas.          Objective:      Vitals:    02/06/20 1305   BP: 136/64   Pulse: 62   Resp: 16   Temp: 98 °F (36.7 °C)   Weight: 44.9 kg (99 lb)   Height: 5' 3" (1.6 m)     Physical Exam   Constitutional: She is oriented to person, place, and time. She appears well-developed and well-nourished. She is cooperative.   HENT:   Right Ear: Tympanic membrane normal.   Left Ear: Tympanic membrane normal.   Eyes: Conjunctivae, EOM and lids are normal. Right pupil is round and reactive. Left pupil is round and reactive.   Neck: Trachea normal and normal range of motion. Neck supple. No JVD present. Carotid bruit is not present. No thyromegaly present.   Cardiovascular: Normal rate, regular rhythm, S1 normal, S2 normal, normal heart sounds and intact distal pulses. Exam reveals no gallop and no friction rub.   No murmur heard.  Pulmonary/Chest: Breath sounds normal. No respiratory distress. She has no wheezes. She has no rales.   Abdominal: Soft. Bowel sounds are normal. She exhibits no mass. There is no tenderness. There is no rigidity and no guarding.   Musculoskeletal: She exhibits no edema.   Neurological: She is alert and oriented to person, place, and time.   Skin: Skin is warm and dry. No lesion and no rash noted. Nails show no clubbing.        Psychiatric: She has a normal mood and affect. Her behavior is normal. Judgment and thought content normal.   Nursing note and vitals reviewed.        Assessment:       1. Venous insufficiency of both lower extremities    2. Localized edema    3. Memory loss         Plan:       Venous insufficiency of both lower extremities        -     Diabetic socks    Localized edema        " -     Diabetic socks    Memory loss        -     Will check record re namenda...      Follow up in about 6 months (around 8/6/2020).        2/8/2020 Faith Nichloe M.D.

## 2020-02-06 NOTE — TELEPHONE ENCOUNTER
----- Message from Faith Nichole MD sent at 2/1/2020  2:36 PM CST -----  Urine normal-- needs no follow-up

## 2020-02-09 RX ORDER — NEOMYCIN SULFATE, POLYMYXIN B SULFATE AND DEXAMETHASONE 3.5; 10000; 1 MG/ML; [USP'U]/ML; MG/ML
1 SUSPENSION/ DROPS OPHTHALMIC EVERY 6 HOURS
Qty: 5 ML | Refills: 0 | Status: SHIPPED | OUTPATIENT
Start: 2020-02-09 | End: 2020-02-16

## 2020-03-05 DIAGNOSIS — R41.3 MEMORY LOSS: ICD-10-CM

## 2020-03-07 RX ORDER — DONEPEZIL HYDROCHLORIDE 10 MG/1
TABLET, FILM COATED ORAL
Qty: 90 TABLET | Refills: 1 | Status: SHIPPED | OUTPATIENT
Start: 2020-03-07 | End: 2020-06-17 | Stop reason: SDUPTHER

## 2020-06-17 ENCOUNTER — OFFICE VISIT (OUTPATIENT)
Dept: FAMILY MEDICINE | Facility: CLINIC | Age: 85
End: 2020-06-17
Payer: MEDICARE

## 2020-06-17 VITALS
HEIGHT: 63 IN | WEIGHT: 99 LBS | BODY MASS INDEX: 17.54 KG/M2 | SYSTOLIC BLOOD PRESSURE: 122 MMHG | RESPIRATION RATE: 16 BRPM | OXYGEN SATURATION: 98 % | DIASTOLIC BLOOD PRESSURE: 64 MMHG | TEMPERATURE: 99 F | HEART RATE: 66 BPM

## 2020-06-17 DIAGNOSIS — R41.3 MEMORY LOSS: ICD-10-CM

## 2020-06-17 DIAGNOSIS — L30.9 ECZEMA, UNSPECIFIED TYPE: Primary | ICD-10-CM

## 2020-06-17 PROCEDURE — 99214 PR OFFICE/OUTPT VISIT, EST, LEVL IV, 30-39 MIN: ICD-10-PCS | Mod: S$PBB,,, | Performed by: INTERNAL MEDICINE

## 2020-06-17 PROCEDURE — 99215 OFFICE O/P EST HI 40 MIN: CPT | Performed by: INTERNAL MEDICINE

## 2020-06-17 PROCEDURE — 99214 OFFICE O/P EST MOD 30 MIN: CPT | Mod: S$PBB,,, | Performed by: INTERNAL MEDICINE

## 2020-06-17 RX ORDER — TRIAMCINOLONE ACETONIDE 1 MG/G
OINTMENT TOPICAL 2 TIMES DAILY
Qty: 30 G | Refills: 0 | Status: SHIPPED | OUTPATIENT
Start: 2020-06-17 | End: 2020-08-06

## 2020-06-17 RX ORDER — DONEPEZIL HYDROCHLORIDE 10 MG/1
10 TABLET, FILM COATED ORAL NIGHTLY
Qty: 90 TABLET | Refills: 1 | Status: SHIPPED | OUTPATIENT
Start: 2020-06-17 | End: 2020-08-06

## 2020-06-17 RX ORDER — PREDNISONE 10 MG/1
TABLET ORAL
Qty: 7 TABLET | Refills: 0 | Status: SHIPPED | OUTPATIENT
Start: 2020-06-17 | End: 2020-08-06

## 2020-06-17 NOTE — PROGRESS NOTES
SUBJECTIVE:    Patient ID: Morena Mcclendon is a 86 y.o. female.    Chief Complaint: Recurrent Skin Infections (on face)    HPI     Three weeks ago patient developed a conjunctivitis of the eye resulting in extreme scleral swelling with extrusion of the right sclera-responded to eye drops-gradually the face got involved and she has had severe pruritis -there is some swelling of the face and erythema and intense itching. The face looks scalded-sunburned, but no exposure and no new contacts.      Lab Visit on 01/31/2020   Component Date Value Ref Range Status    Specimen UA 01/31/2020 Urine, Clean Catch   Final    Color, UA 01/31/2020 Yellow  Yellow, Straw, Ivett Final    Appearance, UA 01/31/2020 Clear  Clear Final    pH, UA 01/31/2020 6.0  5.0 - 8.0 Final    Specific [place], UA 01/31/2020 1.025  1.005 - 1.030 Final    Protein, UA 01/31/2020 Trace* Negative Final    Glucose, UA 01/31/2020 Negative  Negative Final    Ketones, UA 01/31/2020 Negative  Negative Final    Bilirubin (UA) 01/31/2020 Negative  Negative Final    Occult Blood UA 01/31/2020 Negative  Negative Final    Nitrite, UA 01/31/2020 Negative  Negative Final    Urobilinogen, UA 01/31/2020 Negative  Negative EU/dL Final    Leukocytes, UA 01/31/2020 Negative  Negative Final    Sodium 01/31/2020 138  136 - 145 mmol/L Final    Potassium 01/31/2020 4.9  3.5 - 5.1 mmol/L Final    Chloride 01/31/2020 104  95 - 110 mmol/L Final    CO2 01/31/2020 28  23 - 29 mmol/L Final    Glucose 01/31/2020 94  70 - 110 mg/dL Final    BUN, Bld 01/31/2020 20  8 - 23 mg/dL Final    Creatinine 01/31/2020 1.3  0.5 - 1.4 mg/dL Final    Calcium 01/31/2020 8.7  8.7 - 10.5 mg/dL Final    Total Protein 01/31/2020 6.1  6.0 - 8.4 g/dL Final    Albumin 01/31/2020 3.4* 3.5 - 5.2 g/dL Final    Total Bilirubin 01/31/2020 0.8  0.1 - 1.0 mg/dL Final    Alkaline Phosphatase 01/31/2020 72  55 - 135 U/L Final    AST 01/31/2020 24  10 - 40 U/L Final    ALT 01/31/2020 20   10 - 44 U/L Final    Anion Gap 01/31/2020 6* 8 - 16 mmol/L Final    eGFR if African American 01/31/2020 42.9* >60 mL/min/1.73 m^2 Final    eGFR if non African American 01/31/2020 37.2* >60 mL/min/1.73 m^2 Final    WBC 01/31/2020 6.87  3.90 - 12.70 K/uL Final    RBC 01/31/2020 3.56* 4.00 - 5.40 M/uL Final    Hemoglobin 01/31/2020 10.4* 12.0 - 16.0 g/dL Final    Hematocrit 01/31/2020 33.6* 37.0 - 48.5 % Final    Mean Corpuscular Volume 01/31/2020 94  82 - 98 fL Final    Mean Corpuscular Hemoglobin 01/31/2020 29.2  27.0 - 31.0 pg Final    Mean Corpuscular Hemoglobin Conc 01/31/2020 31.0* 32.0 - 36.0 g/dL Final    RDW 01/31/2020 13.8  11.5 - 14.5 % Final    Platelets 01/31/2020 195  150 - 350 K/uL Final    MPV 01/31/2020 10.7  9.2 - 12.9 fL Final    Immature Granulocytes 01/31/2020 0.4  0.0 - 0.5 % Final    Gran # (ANC) 01/31/2020 5.2  1.8 - 7.7 K/uL Final    Immature Grans (Abs) 01/31/2020 0.03  0.00 - 0.04 K/uL Final    Lymph # 01/31/2020 0.7* 1.0 - 4.8 K/uL Final    Mono # 01/31/2020 0.7  0.3 - 1.0 K/uL Final    Eos # 01/31/2020 0.3  0.0 - 0.5 K/uL Final    Baso # 01/31/2020 0.02  0.00 - 0.20 K/uL Final    nRBC 01/31/2020 0  0 /100 WBC Final    Gran% 01/31/2020 75.8* 38.0 - 73.0 % Final    Lymph% 01/31/2020 9.5* 18.0 - 48.0 % Final    Mono% 01/31/2020 9.6  4.0 - 15.0 % Final    Eosinophil% 01/31/2020 4.4  0.0 - 8.0 % Final    Basophil% 01/31/2020 0.3  0.0 - 1.9 % Final    Differential Method 01/31/2020 Automated   Final       Past Medical History:   Diagnosis Date    Anemia     Cancer     colon, removed all but 12 in    Cataract     Dementia      Past Surgical History:   Procedure Laterality Date    APPENDECTOMY      COLON SURGERY      EYE SURGERY      HERNIA REPAIR      HYSTERECTOMY      tumor removed  2014     Family History   Problem Relation Age of Onset    Arthritis Mother     Depression Mother     Emphysema Father     Hearing loss Maternal Grandmother        Marital  Status:   Alcohol History:  reports current alcohol use of about 1.0 standard drinks of alcohol per week.  Tobacco History:  reports that she has never smoked. She has never used smokeless tobacco.  Drug History:  reports no history of drug use.    Review of patient's allergies indicates:  No Known Allergies    Current Outpatient Medications:     diphenoxylate-atropine 2.5-0.025 mg (LOMOTIL) 2.5-0.025 mg per tablet, Take 1 tablet by mouth 4 (four) times daily as needed for Diarrhea., Disp: 120 tablet, Rfl: 2    donepeziL (ARICEPT) 10 MG tablet, Take 1 tablet (10 mg total) by mouth every evening., Disp: 90 tablet, Rfl: 1    Lactobacillus rhamnosus GG (CULTURELLE) 10 billion cell capsule, Take 1 capsule by mouth once daily., Disp: , Rfl:     multivitamin with minerals tablet, Take 1 tablet by mouth once daily., Disp: , Rfl:     vit C/E/Zn/coppr/lutein/zeaxan (PRESERVISION AREDS-2 ORAL), Take 1 tablet by mouth once daily., Disp: , Rfl:     predniSONE (DELTASONE) 10 MG tablet, Take two today and then one a day for 5 days, Disp: 7 tablet, Rfl: 0    triamcinolone acetonide 0.1% (KENALOG) 0.1 % ointment, Apply topically 2 (two) times daily. Apply to face twice daily, Disp: 30 g, Rfl: 0    Review of Systems   Constitutional: Negative for chills, fatigue, fever and unexpected weight change.   HENT: Negative for congestion, ear pain, hearing loss, sinus pain and sore throat.    Eyes: Negative for pain and visual disturbance.   Respiratory: Negative for cough, shortness of breath and wheezing.    Cardiovascular: Negative for chest pain, palpitations and leg swelling.   Gastrointestinal: Negative for abdominal pain, blood in stool, constipation, diarrhea, nausea and vomiting.   Endocrine: Negative for cold intolerance and heat intolerance.   Genitourinary: Negative for difficulty urinating, dysuria, frequency, pelvic pain and urgency.   Musculoskeletal: Negative for back pain, joint swelling and neck pain.   Skin:  "Negative for pallor and rash.   Neurological: Negative for dizziness, tremors, weakness, numbness and headaches.        Memory being treated   Hematological: Does not bruise/bleed easily.   Psychiatric/Behavioral: Negative for agitation, sleep disturbance and suicidal ideas.          Objective:      Vitals:    06/17/20 1459   BP: 122/64   Pulse: 66   Resp: 16   Temp: 98.7 °F (37.1 °C)   SpO2: 98%   Weight: 44.9 kg (99 lb)   Height: 5' 3" (1.6 m)     Physical Exam  Vitals signs and nursing note reviewed.   Constitutional:       General: She is in acute distress.      Appearance: Normal appearance. She is well-developed. She is ill-appearing.   HENT:      Head: Normocephalic and atraumatic.      Nose: Nose normal. No congestion or rhinorrhea.      Mouth/Throat:      Mouth: Mucous membranes are moist.   Eyes:      General: Lids are normal.      Extraocular Movements: Extraocular movements intact.      Conjunctiva/sclera: Conjunctivae normal.      Pupils: Pupils are equal, round, and reactive to light.      Right eye: Pupil is round and reactive.      Left eye: Pupil is round and reactive.   Neck:      Musculoskeletal: Neck supple. No neck rigidity or muscular tenderness.      Thyroid: No thyromegaly.      Vascular: No carotid bruit or JVD.      Trachea: Trachea normal.   Cardiovascular:      Rate and Rhythm: Normal rate and regular rhythm.      Pulses: Normal pulses.      Heart sounds: S1 normal and S2 normal. No murmur. No friction rub. No gallop.    Pulmonary:      Effort: Pulmonary effort is normal. No respiratory distress.      Breath sounds: Normal breath sounds. No stridor. No wheezing or rales.   Abdominal:      General: Abdomen is flat. Bowel sounds are normal. There is no distension.      Palpations: Abdomen is soft. Abdomen is not rigid. There is no mass.      Tenderness: There is no guarding.   Musculoskeletal: Normal range of motion.   Lymphadenopathy:      Cervical: Cervical adenopathy present.   Skin:     " General: Skin is warm and dry.      Capillary Refill: Capillary refill takes less than 2 seconds.      Findings: No lesion or rash.      Nails: There is no clubbing.        Comments: Lateral cheeks and temporals and chin with diffuse warm erythema and some minimal blister like lesions temples-one small lymph nose about 1/2 cm right ant cervical chain   Neurological:      Mental Status: She is alert and oriented to person, place, and time.   Psychiatric:         Behavior: Behavior normal. Behavior is cooperative.         Thought Content: Thought content normal.         Judgment: Judgment normal.           Assessment:       1. Eczema, unspecified type    2. Memory loss         Plan:       Eczema, unspecified type  -     predniSONE (DELTASONE) 10 MG tablet; Take two today and then one a day for 5 days  Dispense: 7 tablet; Refill: 0  -     triamcinolone acetonide 0.1% (KENALOG) 0.1 % ointment; Apply topically 2 (two) times daily. Apply to face twice daily  Dispense: 30 g; Refill: 0    Memory loss  -     donepeziL (ARICEPT) 10 MG tablet; Take 1 tablet (10 mg total) by mouth every evening.  Dispense: 90 tablet; Refill: 1      No follow-ups on file.        6/18/2020 Faith Nichole M.D.

## 2020-08-02 NOTE — PROGRESS NOTES
SUBJECTIVE:    Patient ID: Morena Mcclendon is a 86 y.o. female.    Chief Complaint: Memory Loss and Follow-up    HPI     Patient is in for recheck-she has gained four pounds! Recently she left the house at night  and there was a mid of night search involving many, and she was found about 600 feet away knocking on a garage door of a home that was the same color as the home of her son-Aricept was not felt to be very helpful and family stopped it-recently the patient lost her little dog and her son as let their two dachsund's stay with her ( could that difference have confused her more, and was she looking for her dog? .    Family says pt is refusing to take showers, and I discussed with her the importance of cleanliness, particularly in light of what [place] country is experiencing now....TIME OF VISIT 25 MINUTES    No visits with results within 6 Month(s) from this visit.   Latest known visit with results is:   Lab Visit on 01/31/2020   Component Date Value Ref Range Status    Specimen UA 01/31/2020 Urine, Clean Catch   Final    Color, UA 01/31/2020 Yellow  Yellow, Straw, Ivett Final    Appearance, UA 01/31/2020 Clear  Clear Final    pH, UA 01/31/2020 6.0  5.0 - 8.0 Final    Specific [place], UA 01/31/2020 1.025  1.005 - 1.030 Final    Protein, UA 01/31/2020 Trace* Negative Final    Glucose, UA 01/31/2020 Negative  Negative Final    Ketones, UA 01/31/2020 Negative  Negative Final    Bilirubin (UA) 01/31/2020 Negative  Negative Final    Occult Blood UA 01/31/2020 Negative  Negative Final    Nitrite, UA 01/31/2020 Negative  Negative Final    Urobilinogen, UA 01/31/2020 Negative  Negative EU/dL Final    Leukocytes, UA 01/31/2020 Negative  Negative Final    Sodium 01/31/2020 138  136 - 145 mmol/L Final    Potassium 01/31/2020 4.9  3.5 - 5.1 mmol/L Final    Chloride 01/31/2020 104  95 - 110 mmol/L Final    CO2 01/31/2020 28  23 - 29 mmol/L Final    Glucose 01/31/2020 94  70 - 110 mg/dL Final    BUN, Bld  01/31/2020 20  8 - 23 mg/dL Final    Creatinine 01/31/2020 1.3  0.5 - 1.4 mg/dL Final    Calcium 01/31/2020 8.7  8.7 - 10.5 mg/dL Final    Total Protein 01/31/2020 6.1  6.0 - 8.4 g/dL Final    Albumin 01/31/2020 3.4* 3.5 - 5.2 g/dL Final    Total Bilirubin 01/31/2020 0.8  0.1 - 1.0 mg/dL Final    Alkaline Phosphatase 01/31/2020 72  55 - 135 U/L Final    AST 01/31/2020 24  10 - 40 U/L Final    ALT 01/31/2020 20  10 - 44 U/L Final    Anion Gap 01/31/2020 6* 8 - 16 mmol/L Final    eGFR if African American 01/31/2020 42.9* >60 mL/min/1.73 m^2 Final    eGFR if non African American 01/31/2020 37.2* >60 mL/min/1.73 m^2 Final    WBC 01/31/2020 6.87  3.90 - 12.70 K/uL Final    RBC 01/31/2020 3.56* 4.00 - 5.40 M/uL Final    Hemoglobin 01/31/2020 10.4* 12.0 - 16.0 g/dL Final    Hematocrit 01/31/2020 33.6* 37.0 - 48.5 % Final    Mean Corpuscular Volume 01/31/2020 94  82 - 98 fL Final    Mean Corpuscular Hemoglobin 01/31/2020 29.2  27.0 - 31.0 pg Final    Mean Corpuscular Hemoglobin Conc 01/31/2020 31.0* 32.0 - 36.0 g/dL Final    RDW 01/31/2020 13.8  11.5 - 14.5 % Final    Platelets 01/31/2020 195  150 - 350 K/uL Final    MPV 01/31/2020 10.7  9.2 - 12.9 fL Final    Immature Granulocytes 01/31/2020 0.4  0.0 - 0.5 % Final    Gran # (ANC) 01/31/2020 5.2  1.8 - 7.7 K/uL Final    Immature Grans (Abs) 01/31/2020 0.03  0.00 - 0.04 K/uL Final    Lymph # 01/31/2020 0.7* 1.0 - 4.8 K/uL Final    Mono # 01/31/2020 0.7  0.3 - 1.0 K/uL Final    Eos # 01/31/2020 0.3  0.0 - 0.5 K/uL Final    Baso # 01/31/2020 0.02  0.00 - 0.20 K/uL Final    nRBC 01/31/2020 0  0 /100 WBC Final    Gran% 01/31/2020 75.8* 38.0 - 73.0 % Final    Lymph% 01/31/2020 9.5* 18.0 - 48.0 % Final    Mono% 01/31/2020 9.6  4.0 - 15.0 % Final    Eosinophil% 01/31/2020 4.4  0.0 - 8.0 % Final    Basophil% 01/31/2020 0.3  0.0 - 1.9 % Final    Differential Method 01/31/2020 Automated   Final       Past Medical History:   Diagnosis Date    Anemia      Cancer     colon, removed all but 12 in    Cataract     Dementia      Past Surgical History:   Procedure Laterality Date    APPENDECTOMY      COLON SURGERY      EYE SURGERY      HERNIA REPAIR      HYSTERECTOMY      tumor removed  2014     Family History   Problem Relation Age of Onset    Arthritis Mother     Depression Mother     Emphysema Father     Hearing loss Maternal Grandmother        Marital Status:   Alcohol History:  reports current alcohol use of about 1.0 standard drinks of alcohol per week.  Tobacco History:  reports that she has never smoked. She has never used smokeless tobacco.  Drug History:  reports no history of drug use.    Review of patient's allergies indicates:  No Known Allergies    Current Outpatient Medications:     diphenoxylate-atropine 2.5-0.025 mg (LOMOTIL) 2.5-0.025 mg per tablet, Take 1 tablet by mouth 4 (four) times daily as needed for Diarrhea., Disp: 120 tablet, Rfl: 2    Lactobacillus rhamnosus GG (CULTURELLE) 10 billion cell capsule, Take 1 capsule by mouth once daily., Disp: , Rfl:     multivitamin with minerals tablet, Take 1 tablet by mouth once daily., Disp: , Rfl:     vit C/E/Zn/coppr/lutein/zeaxan (PRESERVISION AREDS-2 ORAL), Take 1 tablet by mouth once daily., Disp: , Rfl:     Review of Systems   Constitutional: Positive for appetite change. Negative for activity change, chills, fatigue, fever and unexpected weight change.   HENT: Negative for congestion, ear pain, hearing loss, postnasal drip, sinus pain, sneezing, sore throat, tinnitus and trouble swallowing.    Eyes: Negative for pain, discharge and visual disturbance.   Respiratory: Negative for cough, choking, shortness of breath and wheezing.    Cardiovascular: Negative for chest pain, palpitations and leg swelling.   Gastrointestinal: Negative for abdominal distention, abdominal pain, blood in stool, constipation, diarrhea, nausea and vomiting.   Endocrine: Negative for cold intolerance and  "heat intolerance.   Genitourinary: Negative for difficulty urinating, dysuria, frequency, pelvic pain and urgency.   Musculoskeletal: Negative for back pain, joint swelling and neck pain.   Skin: Negative for pallor and rash.   Allergic/Immunologic: Negative for environmental allergies and food allergies.   Neurological: Negative for dizziness, tremors, weakness, numbness and headaches.   Hematological: Does not bruise/bleed easily.   Psychiatric/Behavioral: Positive for confusion. Negative for agitation, dysphoric mood, sleep disturbance and suicidal ideas. The patient is not nervous/anxious.           Objective:      Vitals:    08/06/20 1320   BP: 122/68   Pulse: 78   Resp: 16   Temp: 98.6 °F (37 °C)   SpO2: 97%   Weight: 47 kg (103 lb 9.6 oz)   Height: 5' 3" (1.6 m)     Physical Exam  Constitutional:       General: She is not in acute distress.     Appearance: She is not ill-appearing.      Comments: Thin little lady in NAD-pleasant but very quiet-she can participate in conversation but it is not obvious how much she understands-her answers are short and polite    HENT:      Head: Normocephalic and atraumatic.   Eyes:      General: No scleral icterus.        Right eye: No discharge.         Left eye: No discharge.      Extraocular Movements: Extraocular movements intact.      Conjunctiva/sclera: Conjunctivae normal.      Pupils: Pupils are equal, round, and reactive to light.   Neck:      Musculoskeletal: Normal range of motion and neck supple.   Cardiovascular:      Rate and Rhythm: Normal rate and regular rhythm.      Pulses: Normal pulses.      Heart sounds: Normal heart sounds.   Pulmonary:      Effort: Pulmonary effort is normal.      Breath sounds: Normal breath sounds.   Abdominal:      General: Bowel sounds are normal. There is no distension.      Palpations: Abdomen is soft.      Tenderness: There is no abdominal tenderness.   Skin:     Coloration: Skin is not jaundiced or pale.      Findings: No " bruising, erythema, lesion or rash.   Neurological:      Comments: pleasantly confused and distant           Assessment:       1. Memory loss    2. Venous insufficiency of right leg    3. Chronic renal disease, stage III    4. Iron deficiency anemia, unspecified iron deficiency anemia type    5. Encounter for screening mammogram for breast cancer    6. Menopause         Plan:       Memory loss        -     We discussed high fat diets and hyperbarics as alternative approaches to dementia    Venous insufficiency of right leg       -     asymptomatic at this point    Chronic renal disease, stage III  -     Basic metabolic panel; Future; Expected date: 08/06/2020    Iron deficiency anemia, unspecified iron deficiency anemia type  -     CBC auto differential; Future; Expected date: 08/06/2020    Encounter for screening mammogram for breast cancer  -     Mammo Digital Screening Bilat with [facility]; Future; Expected date: 08/06/2020    Menopause  -     DXA Bone Density Spine And Hip; Future; Expected date: 08/06/2020      No follow-ups on file.        8/6/2020 Faith Nichole M.D.

## 2020-08-06 ENCOUNTER — OFFICE VISIT (OUTPATIENT)
Dept: FAMILY MEDICINE | Facility: CLINIC | Age: 85
End: 2020-08-06
Payer: MEDICARE

## 2020-08-06 VITALS
DIASTOLIC BLOOD PRESSURE: 68 MMHG | HEIGHT: 63 IN | SYSTOLIC BLOOD PRESSURE: 122 MMHG | BODY MASS INDEX: 18.36 KG/M2 | HEART RATE: 78 BPM | RESPIRATION RATE: 16 BRPM | WEIGHT: 103.63 LBS | OXYGEN SATURATION: 97 % | TEMPERATURE: 99 F

## 2020-08-06 DIAGNOSIS — I87.2 VENOUS INSUFFICIENCY OF RIGHT LEG: ICD-10-CM

## 2020-08-06 DIAGNOSIS — N18.30 CHRONIC RENAL DISEASE, STAGE III: ICD-10-CM

## 2020-08-06 DIAGNOSIS — R41.3 MEMORY LOSS: Primary | ICD-10-CM

## 2020-08-06 DIAGNOSIS — Z12.31 ENCOUNTER FOR SCREENING MAMMOGRAM FOR BREAST CANCER: ICD-10-CM

## 2020-08-06 DIAGNOSIS — Z78.0 MENOPAUSE: ICD-10-CM

## 2020-08-06 DIAGNOSIS — D50.9 IRON DEFICIENCY ANEMIA, UNSPECIFIED IRON DEFICIENCY ANEMIA TYPE: ICD-10-CM

## 2020-08-06 PROCEDURE — 99215 OFFICE O/P EST HI 40 MIN: CPT | Performed by: INTERNAL MEDICINE

## 2020-08-06 PROCEDURE — 99214 PR OFFICE/OUTPT VISIT, EST, LEVL IV, 30-39 MIN: ICD-10-PCS | Mod: S$PBB,,, | Performed by: INTERNAL MEDICINE

## 2020-08-06 PROCEDURE — 99214 OFFICE O/P EST MOD 30 MIN: CPT | Mod: S$PBB,,, | Performed by: INTERNAL MEDICINE

## 2020-09-30 ENCOUNTER — TELEPHONE (OUTPATIENT)
Dept: FAMILY MEDICINE | Facility: CLINIC | Age: 85
End: 2020-09-30

## 2020-09-30 NOTE — TELEPHONE ENCOUNTER
----- Message from Dona Coronel sent at 9/25/2020  3:38 PM CDT -----  Regarding: Screening Mammogram and Dexa  Good afternoon.  We have attempted to schedule patient for her screening mammogram and dexa 7 times, negative contact.

## 2020-09-30 NOTE — TELEPHONE ENCOUNTER
Called and spoke with Ms Mcclendon son Kevin, she is out of town , he will notify her of mammogram and Dexa orders, she can call [facility] to schedule

## 2021-03-08 ENCOUNTER — OFFICE VISIT (OUTPATIENT)
Dept: FAMILY MEDICINE | Facility: CLINIC | Age: 86
End: 2021-03-08
Payer: MEDICARE

## 2021-03-08 VITALS
BODY MASS INDEX: 18.07 KG/M2 | WEIGHT: 102 LBS | SYSTOLIC BLOOD PRESSURE: 136 MMHG | TEMPERATURE: 97 F | DIASTOLIC BLOOD PRESSURE: 64 MMHG | HEART RATE: 84 BPM | HEIGHT: 63 IN | RESPIRATION RATE: 16 BRPM | OXYGEN SATURATION: 97 %

## 2021-03-08 DIAGNOSIS — R41.3 MEMORY LOSS: ICD-10-CM

## 2021-03-08 DIAGNOSIS — E78.00 PURE HYPERCHOLESTEROLEMIA: ICD-10-CM

## 2021-03-08 DIAGNOSIS — R19.7 DIARRHEA, UNSPECIFIED TYPE: ICD-10-CM

## 2021-03-08 DIAGNOSIS — D50.9 IRON DEFICIENCY ANEMIA, UNSPECIFIED IRON DEFICIENCY ANEMIA TYPE: ICD-10-CM

## 2021-03-08 DIAGNOSIS — L03.116 CELLULITIS OF LEFT LOWER EXTREMITY: Primary | ICD-10-CM

## 2021-03-08 PROCEDURE — 99214 OFFICE O/P EST MOD 30 MIN: CPT | Performed by: INTERNAL MEDICINE

## 2021-03-08 PROCEDURE — 99214 OFFICE O/P EST MOD 30 MIN: CPT | Mod: S$PBB,,, | Performed by: INTERNAL MEDICINE

## 2021-03-08 PROCEDURE — 99214 PR OFFICE/OUTPT VISIT, EST, LEVL IV, 30-39 MIN: ICD-10-PCS | Mod: S$PBB,,, | Performed by: INTERNAL MEDICINE

## 2021-03-08 RX ORDER — LEVOFLOXACIN 500 MG/1
500 TABLET, FILM COATED ORAL DAILY
Qty: 10 TABLET | Refills: 0 | Status: SHIPPED | OUTPATIENT
Start: 2021-03-08 | End: 2021-04-08

## 2021-03-08 RX ORDER — DIPHENOXYLATE HYDROCHLORIDE AND ATROPINE SULFATE 2.5; .025 MG/1; MG/1
1 TABLET ORAL 4 TIMES DAILY PRN
Qty: 120 TABLET | Refills: 2 | Status: SHIPPED | OUTPATIENT
Start: 2021-03-08 | End: 2021-10-03

## 2021-03-08 RX ORDER — DEXTROAMPHETAMINE SULFATE 5 MG/1
5 TABLET ORAL DAILY
Qty: 30 TABLET | Refills: 0 | Status: SHIPPED | OUTPATIENT
Start: 2021-03-08 | End: 2021-04-08 | Stop reason: SDUPTHER

## 2021-04-08 ENCOUNTER — OFFICE VISIT (OUTPATIENT)
Dept: FAMILY MEDICINE | Facility: CLINIC | Age: 86
End: 2021-04-08
Payer: MEDICARE

## 2021-04-08 VITALS
RESPIRATION RATE: 14 BRPM | BODY MASS INDEX: 16.98 KG/M2 | OXYGEN SATURATION: 99 % | SYSTOLIC BLOOD PRESSURE: 104 MMHG | WEIGHT: 95.81 LBS | DIASTOLIC BLOOD PRESSURE: 62 MMHG | HEIGHT: 63 IN | TEMPERATURE: 99 F | HEART RATE: 62 BPM

## 2021-04-08 DIAGNOSIS — R41.3 MEMORY LOSS: ICD-10-CM

## 2021-04-08 DIAGNOSIS — M20.42 HAMMERTOE OF LEFT FOOT: Primary | ICD-10-CM

## 2021-04-08 PROCEDURE — 99213 OFFICE O/P EST LOW 20 MIN: CPT | Mod: S$PBB,,, | Performed by: INTERNAL MEDICINE

## 2021-04-08 PROCEDURE — 99215 OFFICE O/P EST HI 40 MIN: CPT | Performed by: INTERNAL MEDICINE

## 2021-04-08 PROCEDURE — 99213 PR OFFICE/OUTPT VISIT, EST, LEVL III, 20-29 MIN: ICD-10-PCS | Mod: S$PBB,,, | Performed by: INTERNAL MEDICINE

## 2021-04-08 RX ORDER — DEXTROAMPHETAMINE SULFATE 5 MG/1
5 TABLET ORAL DAILY
Qty: 30 TABLET | Refills: 0 | Status: SHIPPED | OUTPATIENT
Start: 2021-06-08 | End: 2021-09-23

## 2021-04-08 RX ORDER — ASCORBIC ACID 1000 MG
1 TABLET ORAL DAILY
COMMUNITY
End: 2023-03-21

## 2021-04-08 RX ORDER — DEXTROAMPHETAMINE SULFATE 5 MG/1
5 TABLET ORAL DAILY
Qty: 30 TABLET | Refills: 0 | Status: SHIPPED | OUTPATIENT
Start: 2021-05-08 | End: 2021-08-24 | Stop reason: SDUPTHER

## 2021-04-08 RX ORDER — DEXTROAMPHETAMINE SULFATE 5 MG/1
5 TABLET ORAL DAILY
Qty: 30 TABLET | Refills: 0 | Status: SHIPPED | OUTPATIENT
Start: 2021-04-08 | End: 2021-07-15 | Stop reason: SDUPTHER

## 2021-04-08 RX ORDER — DEXTROAMPHETAMINE SULFATE 5 MG/1
5 TABLET ORAL DAILY
Qty: 30 TABLET | Refills: 0 | Status: CANCELLED | OUTPATIENT
Start: 2021-04-08

## 2021-04-08 RX ORDER — MULTIVIT WITH MINERALS/HERBS
1 TABLET ORAL DAILY
COMMUNITY
End: 2023-03-21

## 2021-04-26 ENCOUNTER — OFFICE VISIT (OUTPATIENT)
Dept: PODIATRY | Facility: CLINIC | Age: 86
End: 2021-04-26
Payer: MEDICARE

## 2021-04-26 ENCOUNTER — HOSPITAL ENCOUNTER (OUTPATIENT)
Dept: RADIOLOGY | Facility: CLINIC | Age: 86
Discharge: HOME | End: 2021-04-26
Attending: PODIATRIST
Payer: MEDICARE

## 2021-04-26 VITALS
WEIGHT: 95 LBS | DIASTOLIC BLOOD PRESSURE: 64 MMHG | SYSTOLIC BLOOD PRESSURE: 105 MMHG | RESPIRATION RATE: 16 BRPM | HEART RATE: 60 BPM | OXYGEN SATURATION: 98 % | HEIGHT: 63 IN | BODY MASS INDEX: 16.83 KG/M2

## 2021-04-26 DIAGNOSIS — L97.521 ULCER OF LEFT FOOT, LIMITED TO BREAKDOWN OF SKIN: ICD-10-CM

## 2021-04-26 DIAGNOSIS — M79.675 TOE PAIN, LEFT: ICD-10-CM

## 2021-04-26 DIAGNOSIS — M20.12 HALLUX VALGUS OF LEFT FOOT: ICD-10-CM

## 2021-04-26 DIAGNOSIS — M20.42 HAMMERTOE OF LEFT FOOT: Primary | ICD-10-CM

## 2021-04-26 PROCEDURE — 73630 X-RAY EXAM OF FOOT: CPT | Mod: LT,S$GLB,, | Performed by: RADIOLOGY

## 2021-04-26 PROCEDURE — 99204 OFFICE O/P NEW MOD 45 MIN: CPT | Mod: S$GLB,,, | Performed by: PODIATRIST

## 2021-04-26 PROCEDURE — 99204 PR OFFICE/OUTPT VISIT, NEW, LEVL IV, 45-59 MIN: ICD-10-PCS | Mod: S$GLB,,, | Performed by: PODIATRIST

## 2021-04-26 PROCEDURE — 73630 XR FOOT COMPLETE 3 VIEW LEFT: ICD-10-PCS | Mod: LT,S$GLB,, | Performed by: RADIOLOGY

## 2021-07-18 RX ORDER — DEXTROAMPHETAMINE SULFATE 5 MG/1
5 TABLET ORAL DAILY
Qty: 30 TABLET | Refills: 0 | Status: SHIPPED | OUTPATIENT
Start: 2021-07-18 | End: 2021-09-23

## 2021-08-24 DIAGNOSIS — R41.3 MEMORY LOSS: ICD-10-CM

## 2021-08-31 RX ORDER — DEXTROAMPHETAMINE SULFATE 5 MG/1
5 TABLET ORAL DAILY
Qty: 30 TABLET | Refills: 0 | Status: SHIPPED | OUTPATIENT
Start: 2021-08-31 | End: 2021-09-23

## 2021-09-23 RX ORDER — DEXTROAMPHETAMINE SULFATE 10 MG/1
10 CAPSULE, EXTENDED RELEASE ORAL DAILY
Qty: 30 CAPSULE | Refills: 0 | Status: SHIPPED | OUTPATIENT
Start: 2021-09-23 | End: 2022-09-21

## 2022-09-19 NOTE — PROGRESS NOTES
SUBJECTIVE:    Patient ID: Morena Mcclendon is a 89 y.o. female.    Chief Complaint: Dementia and Follow-up    HPI    Dementia nhxupc-ds-pol has lost some 5 pounds-son says she is staying away from beef-she likes sweets-she now has a stter part time so habits like showers are a bit easier for the family and her-her serjio has not been a progressive decline    No visits with results within 1 Year(s) from this visit.   Latest known visit with results is:   Lab Visit on 01/31/2020   Component Date Value Ref Range Status    Specimen UA 01/31/2020 Urine, Clean Catch   Final    Color, UA 01/31/2020 Yellow  Yellow, Straw, [place] Final    Appearance, UA 01/31/2020 Clear  Clear Final    pH, UA 01/31/2020 6.0  5.0 - 8.0 Final    Specific [place], UA 01/31/2020 1.025  1.005 - 1.030 Final    Protein, UA 01/31/2020 Trace (A)  Negative Final    Glucose, UA 01/31/2020 Negative  Negative Final    Ketones, UA 01/31/2020 Negative  Negative Final    Bilirubin (UA) 01/31/2020 Negative  Negative Final    Occult Blood UA 01/31/2020 Negative  Negative Final    Nitrite, UA 01/31/2020 Negative  Negative Final    Urobilinogen, UA 01/31/2020 Negative  Negative EU/dL Final    Leukocytes, UA 01/31/2020 Negative  Negative Final    Sodium 01/31/2020 138  136 - 145 mmol/L Final    Potassium 01/31/2020 4.9  3.5 - 5.1 mmol/L Final    Chloride 01/31/2020 104  95 - 110 mmol/L Final    CO2 01/31/2020 28  23 - 29 mmol/L Final    Glucose 01/31/2020 94  70 - 110 mg/dL Final    BUN 01/31/2020 20  8 - 23 mg/dL Final    Creatinine 01/31/2020 1.3  0.5 - 1.4 mg/dL Final    Calcium 01/31/2020 8.7  8.7 - 10.5 mg/dL Final    Total Protein 01/31/2020 6.1  6.0 - 8.4 g/dL Final    Albumin 01/31/2020 3.4 (L)  3.5 - 5.2 g/dL Final    Total Bilirubin 01/31/2020 0.8  0.1 - 1.0 mg/dL Final    Alkaline Phosphatase 01/31/2020 72  55 - 135 U/L Final    AST 01/31/2020 24  10 - 40 U/L Final    ALT 01/31/2020 20  10 - 44 U/L Final    Anion Gap  01/31/2020 6 (L)  8 - 16 mmol/L Final    eGFR if African American 01/31/2020 42.9 (A)  >60 mL/min/1.73 m^2 Final    eGFR if non African American 01/31/2020 37.2 (A)  >60 mL/min/1.73 m^2 Final    WBC 01/31/2020 6.87  3.90 - 12.70 K/uL Final    RBC 01/31/2020 3.56 (L)  4.00 - 5.40 M/uL Final    Hemoglobin 01/31/2020 10.4 (L)  12.0 - 16.0 g/dL Final    Hematocrit 01/31/2020 33.6 (L)  37.0 - 48.5 % Final    MCV 01/31/2020 94  82 - 98 fL Final    MCH 01/31/2020 29.2  27.0 - 31.0 pg Final    MCHC 01/31/2020 31.0 (L)  32.0 - 36.0 g/dL Final    RDW 01/31/2020 13.8  11.5 - 14.5 % Final    Platelets 01/31/2020 195  150 - 350 K/uL Final    MPV 01/31/2020 10.7  9.2 - 12.9 fL Final    Immature Granulocytes 01/31/2020 0.4  0.0 - 0.5 % Final    Gran # (ANC) 01/31/2020 5.2  1.8 - 7.7 K/uL Final    Immature Grans (Abs) 01/31/2020 0.03  0.00 - 0.04 K/uL Final    Lymph # 01/31/2020 0.7 (L)  1.0 - 4.8 K/uL Final    Mono # 01/31/2020 0.7  0.3 - 1.0 K/uL Final    Eos # 01/31/2020 0.3  0.0 - 0.5 K/uL Final    Baso # 01/31/2020 0.02  0.00 - 0.20 K/uL Final    nRBC 01/31/2020 0  0 /100 WBC Final    Gran % 01/31/2020 75.8 (H)  38.0 - 73.0 % Final    Lymph % 01/31/2020 9.5 (L)  18.0 - 48.0 % Final    Mono % 01/31/2020 9.6  4.0 - 15.0 % Final    Eosinophil % 01/31/2020 4.4  0.0 - 8.0 % Final    Basophil % 01/31/2020 0.3  0.0 - 1.9 % Final    Differential Method 01/31/2020 Automated   Final       Past Medical History:   Diagnosis Date    Anemia     Cancer     colon, removed all but 12 in    Cataract     Dementia      Past Surgical History:   Procedure Laterality Date    APPENDECTOMY      COLON SURGERY      EYE SURGERY      HERNIA REPAIR      HYSTERECTOMY      tumor removed  2014     Family History   Problem Relation Age of Onset    Arthritis Mother     Depression Mother     Emphysema Father     Hearing loss Maternal Grandmother        Marital Status:   Alcohol History:  reports current alcohol use  of about 1.0 standard drink per week.  Tobacco History:  reports that she has never smoked. She has never used smokeless tobacco.  Drug History:  reports no history of drug use.    Review of patient's allergies indicates:  No Known Allergies    Current Outpatient Medications:     b complex vitamins tablet, Take 1 tablet by mouth once daily., Disp: , Rfl:     diphenoxylate-atropine 2.5-0.025 mg (LOMOTIL) 2.5-0.025 mg per tablet, TAKE 1 TABLET BY MOUTH FOUR TIMES DAILY AS NEEDED FOR DIARRHEA, Disp: 120 tablet, Rfl: 0    ginkgo biloba 40 mg Tab, Take 1 tablet by mouth once daily., Disp: , Rfl:     Lactobacillus rhamnosus GG (CULTURELLE) 10 billion cell capsule, Take 1 capsule by mouth once daily., Disp: , Rfl:     multivitamin with minerals tablet, Take 1 tablet by mouth once daily., Disp: , Rfl:     UNABLE TO FIND, cbd gummy, Disp: , Rfl:     Review of Systems   Constitutional:  Negative for activity change, appetite change, chills, fatigue, fever and unexpected weight change.   HENT:  Negative for congestion, ear pain, hearing loss, postnasal drip, sinus pain, sneezing, sore throat and trouble swallowing.    Eyes:  Negative for pain, discharge and visual disturbance.   Respiratory:  Negative for cough, choking, shortness of breath and wheezing.    Cardiovascular:  Negative for chest pain, palpitations and leg swelling.   Gastrointestinal:  Negative for abdominal distention, abdominal pain, blood in stool, constipation, diarrhea, nausea and vomiting.   Endocrine: Negative for cold intolerance and heat intolerance.   Genitourinary:  Negative for difficulty urinating, dysuria, frequency, pelvic pain and urgency.        U.I   Musculoskeletal:  Negative for back pain, joint swelling and neck pain.   Skin:  Negative for pallor and rash.   Allergic/Immunologic: Negative for environmental allergies and food allergies.   Neurological:  Negative for dizziness, tremors, weakness, numbness and headaches.   Hematological:   Does not bruise/bleed easily.   Psychiatric/Behavioral:  Negative for agitation, confusion, dysphoric mood, sleep disturbance and suicidal ideas. The patient is not nervous/anxious.         Objective:      Vitals    Vitals - 1 value per visit 4/8/2021 4/26/2021 4/26/2021 9/21/2022 9/21/2022   SYSTOLIC 104 - 105 - 120   DIASTOLIC 62 - 64 - 62   Pulse 62 - 60 - 68   Temp 98.6 - - - 98.7   Resp 14 - 16 - 12   SPO2 99 - 98 - 97   Weight (lb) 95.8 - 95 - 89.8   Weight (kg) 43.455 - 43.092 - 40.733   Height 63 - 63 - 63   BMI (Calculated) 17 - 16.8 - 15.9   VISIT REPORT - - - - -   Pain Score  - 2 - 0 -       Physical Exam  Vitals and nursing note reviewed.   Constitutional:       Comments: Very pleasant little lady who can interact to a degree in the conversation-thin   HENT:      Head: Normocephalic and atraumatic.   Eyes:      Extraocular Movements: Extraocular movements intact.      Pupils: Pupils are equal, round, and reactive to light.      Comments: Conjunctiva with minimal discoloration-no specific abnormality   Neck:      Vascular: No carotid bruit.   Cardiovascular:      Rate and Rhythm: Normal rate.      Pulses: Normal pulses.      Heart sounds: Normal heart sounds.   Pulmonary:      Effort: Pulmonary effort is normal.      Breath sounds: Normal breath sounds.   Abdominal:      General: There is distension.      Comments: No tenderness-abdomen slightly distended   Musculoskeletal:         General: Swelling (minimal both feet) present.      Comments: I can detect some hammertoes right foot thru the shoe-there is some thoracic kyphosis   Lymphadenopathy:      Cervical: No cervical adenopathy.   Skin:     General: Skin is warm.      Comments: Small abrasion lower left lateral leg-hemostasis good   Psychiatric:         Mood and Affect: Mood normal.         Behavior: Behavior normal.         Assessment:       No diagnosis found.     Plan:       There are no diagnoses linked to this encounter.  No follow-ups on  file.        9/21/2022 Faith Nichole M.D.

## 2022-09-21 ENCOUNTER — OFFICE VISIT (OUTPATIENT)
Dept: FAMILY MEDICINE | Facility: CLINIC | Age: 87
End: 2022-09-21
Payer: MEDICARE

## 2022-09-21 VITALS
HEART RATE: 68 BPM | HEIGHT: 63 IN | DIASTOLIC BLOOD PRESSURE: 62 MMHG | TEMPERATURE: 99 F | OXYGEN SATURATION: 97 % | RESPIRATION RATE: 12 BRPM | SYSTOLIC BLOOD PRESSURE: 120 MMHG | WEIGHT: 89.81 LBS | BODY MASS INDEX: 15.91 KG/M2

## 2022-09-21 DIAGNOSIS — F03.90 DEMENTIA WITHOUT BEHAVIORAL DISTURBANCE, UNSPECIFIED DEMENTIA TYPE: Primary | ICD-10-CM

## 2022-09-21 DIAGNOSIS — N18.32 STAGE 3B CHRONIC KIDNEY DISEASE: ICD-10-CM

## 2022-09-21 DIAGNOSIS — D64.9 CHRONIC ANEMIA: ICD-10-CM

## 2022-09-21 PROCEDURE — 99213 OFFICE O/P EST LOW 20 MIN: CPT | Mod: S$PBB,AQ,, | Performed by: INTERNAL MEDICINE

## 2022-09-21 PROCEDURE — 99214 OFFICE O/P EST MOD 30 MIN: CPT | Performed by: INTERNAL MEDICINE

## 2022-09-21 PROCEDURE — 99213 PR OFFICE/OUTPT VISIT, EST, LEVL III, 20-29 MIN: ICD-10-PCS | Mod: S$PBB,AQ,, | Performed by: INTERNAL MEDICINE

## 2022-10-07 ENCOUNTER — LAB VISIT (OUTPATIENT)
Dept: LAB | Facility: HOSPITAL | Age: 87
End: 2022-10-07
Attending: INTERNAL MEDICINE
Payer: MEDICARE

## 2022-10-07 DIAGNOSIS — D64.9 CHRONIC ANEMIA: ICD-10-CM

## 2022-10-07 DIAGNOSIS — N18.32 STAGE 3B CHRONIC KIDNEY DISEASE: ICD-10-CM

## 2022-10-07 LAB
ALBUMIN SERPL BCP-MCNC: 3.8 G/DL (ref 3.5–5.2)
ALP SERPL-CCNC: 94 U/L (ref 55–135)
ALT SERPL W/O P-5'-P-CCNC: 15 U/L (ref 10–44)
ANION GAP SERPL CALC-SCNC: 8 MMOL/L (ref 8–16)
AST SERPL-CCNC: 19 U/L (ref 10–40)
BASOPHILS # BLD AUTO: 0.02 K/UL (ref 0–0.2)
BASOPHILS NFR BLD: 0.4 % (ref 0–1.9)
BILIRUB SERPL-MCNC: 0.7 MG/DL (ref 0.1–1)
BUN SERPL-MCNC: 27 MG/DL (ref 8–23)
CALCIUM SERPL-MCNC: 8.7 MG/DL (ref 8.7–10.5)
CHLORIDE SERPL-SCNC: 107 MMOL/L (ref 95–110)
CO2 SERPL-SCNC: 23 MMOL/L (ref 23–29)
CREAT SERPL-MCNC: 1.1 MG/DL (ref 0.5–1.4)
DIFFERENTIAL METHOD: ABNORMAL
EOSINOPHIL # BLD AUTO: 0.2 K/UL (ref 0–0.5)
EOSINOPHIL NFR BLD: 3.6 % (ref 0–8)
ERYTHROCYTE [DISTWIDTH] IN BLOOD BY AUTOMATED COUNT: 14.5 % (ref 11.5–14.5)
EST. GFR  (NO RACE VARIABLE): 48 ML/MIN/1.73 M^2
GLUCOSE SERPL-MCNC: 83 MG/DL (ref 70–110)
HCT VFR BLD AUTO: 37 % (ref 37–48.5)
HGB BLD-MCNC: 11.3 G/DL (ref 12–16)
IMM GRANULOCYTES # BLD AUTO: 0.02 K/UL (ref 0–0.04)
IMM GRANULOCYTES NFR BLD AUTO: 0.4 % (ref 0–0.5)
LYMPHOCYTES # BLD AUTO: 0.7 K/UL (ref 1–4.8)
LYMPHOCYTES NFR BLD: 14 % (ref 18–48)
MCH RBC QN AUTO: 28.6 PG (ref 27–31)
MCHC RBC AUTO-ENTMCNC: 30.5 G/DL (ref 32–36)
MCV RBC AUTO: 94 FL (ref 82–98)
MONOCYTES # BLD AUTO: 0.5 K/UL (ref 0.3–1)
MONOCYTES NFR BLD: 9.1 % (ref 4–15)
NEUTROPHILS # BLD AUTO: 3.8 K/UL (ref 1.8–7.7)
NEUTROPHILS NFR BLD: 72.5 % (ref 38–73)
NRBC BLD-RTO: 0 /100 WBC
PLATELET # BLD AUTO: 205 K/UL (ref 150–450)
PMV BLD AUTO: 11.1 FL (ref 9.2–12.9)
POTASSIUM SERPL-SCNC: 4.4 MMOL/L (ref 3.5–5.1)
PROT SERPL-MCNC: 7.1 G/DL (ref 6–8.4)
RBC # BLD AUTO: 3.95 M/UL (ref 4–5.4)
SODIUM SERPL-SCNC: 138 MMOL/L (ref 136–145)
WBC # BLD AUTO: 5.27 K/UL (ref 3.9–12.7)

## 2022-10-07 PROCEDURE — 80053 COMPREHEN METABOLIC PANEL: CPT | Performed by: INTERNAL MEDICINE

## 2022-10-07 PROCEDURE — 85025 COMPLETE CBC W/AUTO DIFF WBC: CPT | Performed by: INTERNAL MEDICINE

## 2022-10-07 PROCEDURE — 36415 COLL VENOUS BLD VENIPUNCTURE: CPT | Performed by: INTERNAL MEDICINE

## 2022-11-17 ENCOUNTER — TELEPHONE (OUTPATIENT)
Dept: FAMILY MEDICINE | Facility: CLINIC | Age: 87
End: 2022-11-17

## 2022-11-17 NOTE — TELEPHONE ENCOUNTER
Patient's son notified of results and recommendations,   Verbalizes understanding. Will give more her more fluids and monitor her date.

## 2023-02-20 ENCOUNTER — TELEPHONE (OUTPATIENT)
Dept: FAMILY MEDICINE | Facility: CLINIC | Age: 88
End: 2023-02-20

## 2023-02-20 DIAGNOSIS — M25.559 HIP PAIN: Primary | ICD-10-CM

## 2023-02-20 NOTE — TELEPHONE ENCOUNTER
Pt's son reports his  Mom is limping a little bit. The HH nurse said she has a knot on her left hip. Pt did not fall /injure it as far as he knows. She does not have an ortho MD.    Please advise.   Pt next office visit is 3/21/23

## 2023-02-23 ENCOUNTER — OFFICE VISIT (OUTPATIENT)
Dept: ORTHOPEDICS | Facility: CLINIC | Age: 88
End: 2023-02-23
Payer: MEDICARE

## 2023-02-23 VITALS — BODY MASS INDEX: 15.77 KG/M2 | HEIGHT: 63 IN | WEIGHT: 89 LBS

## 2023-02-23 DIAGNOSIS — M79.89 MASS OF SOFT TISSUE: Primary | ICD-10-CM

## 2023-02-23 PROCEDURE — 99203 PR OFFICE/OUTPT VISIT, NEW, LEVL III, 30-44 MIN: ICD-10-PCS | Mod: S$GLB,,, | Performed by: PHYSICIAN ASSISTANT

## 2023-02-23 PROCEDURE — 99203 OFFICE O/P NEW LOW 30 MIN: CPT | Mod: S$GLB,,, | Performed by: PHYSICIAN ASSISTANT

## 2023-02-23 NOTE — PROGRESS NOTES
[facility]  [address]  [place] [address]  Phone: [phone]   [phone]    Patient's PCP: Faith Nichole MD  Referring Provider: No ref. provider found    Subjective:      Chief Complaint:   Chief Complaint   Patient presents with    Left Hip - Pain     She home health noticed a knot at her left hip, she is not complaining of pain       Past Medical History:   Diagnosis Date    Anemia     Cancer     colon, removed all but 12 in    Cataract     Dementia        Past Surgical History:   Procedure Laterality Date    APPENDECTOMY      COLON SURGERY      EYE SURGERY      HERNIA REPAIR      HYSTERECTOMY      tumor removed  2014       Current Outpatient Medications   Medication Sig    b complex vitamins tablet Take 1 tablet by mouth once daily.    diphenoxylate-atropine 2.5-0.025 mg (LOMOTIL) 2.5-0.025 mg per tablet TAKE 1 TABLET BY MOUTH FOUR TIMES DAILY AS NEEDED FOR DIARRHEA    ginkgo biloba 40 mg Tab Take 1 tablet by mouth once daily.    Lactobacillus rhamnosus GG (CULTURELLE) 10 billion cell capsule Take 1 capsule by mouth once daily.    multivitamin with minerals tablet Take 1 tablet by mouth once daily.    UNABLE TO FIND cbd gummy     No current facility-administered medications for this visit.       Review of patient's allergies indicates:  No Known Allergies    Family History   Problem Relation Age of Onset    Arthritis Mother     Depression Mother     Emphysema Father     Hearing loss Maternal Grandmother        Social History     Socioeconomic History    Marital status:    Tobacco Use    Smoking status: Never    Smokeless tobacco: Never   Substance and Sexual Activity    Alcohol use: Yes     Alcohol/week: 1.0 standard drink     Types: 1 Glasses of wine per week     Comment: daily    Drug use: No    Sexual activity: Not Currently       Prior to meeting with the patient I reviewed the medical [facility]. This included reviewing the previous progress notes from our  office, review of the patient's last appointment with their primary care provider, review of any visits to the emergency room, and review of any pain management appointments or procedures.    History of present illness:  89-year-old female with complaints of swelling to the left hip.  She received a referral from primary care.  Out of an abundance of caution, her home health nurse felt a soft tissue mass or swelling over the left hip.  It is not painful.      Review of Systems:    Constitutional: Negative for chills, fever and weight loss.   HENT: Negative for congestion.    Eyes: Negative for discharge and redness.   Respiratory: Negative for cough and shortness of breath.    Cardiovascular: Negative for chest pain.   Gastrointestinal: Negative for nausea and vomiting.   Musculoskeletal: See HPI.   Skin: Negative for rash.   Neurological: Negative for headaches.   Endo/Heme/Allergies: Does not bruise/bleed easily.   Psychiatric/Behavioral: The patient is not nervous/anxious.    All other systems reviewed and are negative.       Objective:      Physical Examination:    Vital Signs:  There were no vitals filed for this visit.    Body mass index is 15.77 kg/m².    This a well-developed, well nourished patient in no acute distress.  They are alert and oriented and cooperative to examination.     Examination of the left hip, the skin over the left hip is dry and intact, no erythema ecchymosis, no signs symptoms of infection.  She is not tender over the greater trochanter, however just posterior to the greater trochanter she has a palpable 1-1/2-2 cm mass palpable.  It is mobile, it is firm, not fluctuant and nontender to the touch.      Pertinent New Results:        XRAY Report / Interpretation:   AP of the pelvis, normal osseous structures presence of right total hip arthroplasty left hip without significant osteoarthritis.  No extra-articular calcifications are seen.          Assessment:       1. Mass of soft tissue       Plan:     Mass of soft tissue  -     Cancel: X-Ray Hip 1 View Left (with Pelvis when performed)  -     X-Ray Pelvis Routine AP  -     MRI Hip Without Contrast Left; Future; Expected date: 02/23/2023        Follow up for MRI results Left Hip.    89-year-old female with a left hip soft tissue mass.  Just posterior to the greater trochanter on the left side.  Nontender.  A little too firm for me to consider it being a lipoma but still in the differential diagnosis.  No evidence of extra-articular calcification from x-ray but again within the differential.  I have ordered MRI of the left hip to better assess the soft tissue mass.  Depending on the information provided by the MRI we can make better recommendations on whether not to just observe this, or if it is painful to the patient which she states sometimes sitting on her left hip it can become uncomfortable due we discussed excision.  Follow-up with the MRI results.      ENIL Holcomb, PA-C    This note was created using [employer] voice recognition software that occasionally misinterprets words or phrases.

## 2023-02-23 NOTE — PROGRESS NOTES
[facility]  [address]  [place] [address]  Phone: [phone]   [phone]    Patient's PCP: Faith Nichole MD  Referring Provider: No ref. provider found    Subjective:      Chief Complaint: No chief complaint on file.      Past Medical History:   Diagnosis Date    Anemia     Cancer     colon, removed all but 12 in    Cataract     Dementia        Past Surgical History:   Procedure Laterality Date    APPENDECTOMY      COLON SURGERY      EYE SURGERY      HERNIA REPAIR      HYSTERECTOMY      tumor removed  2014       Current Outpatient Medications   Medication Sig    b complex vitamins tablet Take 1 tablet by mouth once daily.    diphenoxylate-atropine 2.5-0.025 mg (LOMOTIL) 2.5-0.025 mg per tablet TAKE 1 TABLET BY MOUTH FOUR TIMES DAILY AS NEEDED FOR DIARRHEA    ginkgo biloba 40 mg Tab Take 1 tablet by mouth once daily.    Lactobacillus rhamnosus GG (CULTURELLE) 10 billion cell capsule Take 1 capsule by mouth once daily.    multivitamin with minerals tablet Take 1 tablet by mouth once daily.    UNABLE TO FIND cbd gummy     No current facility-administered medications for this visit.       Review of patient's allergies indicates:  No Known Allergies    Family History   Problem Relation Age of Onset    Arthritis Mother     Depression Mother     Emphysema Father     Hearing loss Maternal Grandmother        Social History     Socioeconomic History    Marital status:    Tobacco Use    Smoking status: Never    Smokeless tobacco: Never   Substance and Sexual Activity    Alcohol use: Yes     Alcohol/week: 1.0 standard drink     Types: 1 Glasses of wine per week     Comment: daily    Drug use: No    Sexual activity: Not Currently       Prior to meeting with the patient I reviewed the medical chart in [facility]. This included reviewing the previous progress notes from our office, review of the patient's last appointment with their primary care provider, review of any visits to the  emergency room, and review of any pain management appointments or procedures.    History of present illness:  89-year-old female with left hip pain.  Referred from primary care as a new patient for evaluation.      Review of Systems:    Constitutional: Negative for chills, fever and weight loss.   HENT: Negative for congestion.    Eyes: Negative for discharge and redness.   Respiratory: Negative for cough and shortness of breath.    Cardiovascular: Negative for chest pain.   Gastrointestinal: Negative for nausea and vomiting.   Musculoskeletal: See HPI.   Skin: Negative for rash.   Neurological: Negative for headaches.   Endo/Heme/Allergies: Does not bruise/bleed easily.   Psychiatric/Behavioral: The patient is not nervous/anxious.    All other systems reviewed and are negative.       Objective:      Physical Examination:    Vital Signs:  There were no vitals filed for this visit.    There is no height or weight on file to calculate BMI.    This a well-developed, well nourished patient in no acute distress.  They are alert and oriented and cooperative to examination.     ***      Pertinent New Results:        XRAY Report / Interpretation:   ***          Assessment:       1. Pain      Plan:     Pain        No follow-ups on file.    ***      NEIL Holcomb, ZENAIDA    This note was created using [employer] voice recognition software that occasionally misinterprets words or phrases.

## 2023-03-20 NOTE — PROGRESS NOTES
SUBJECTIVE:    Patient ID: Morena Mcclendon is a 89 y.o. female.    Chief Complaint: Dementia and Follow-up    HPI    Dementia follow-up-weight is up some-her med for diarrhea is no longer working-    She is having periodic pain in the right hip with hx hip replacement    Last 3 sets of Vitals    Vitals - 1 value per visit 2/23/2023 3/21/2023 3/21/2023   SYSTOLIC - - 124   DIASTOLIC - - 76   Pulse - - 82   Temp - - 98.2   Resp - - 12   SPO2 - - 97   Weight (lb) 89 - 92.2   Weight (kg) 40.37 - 41.822   Height 63 - 63   BMI (Calculated) 15.8 - 16.3   VISIT REPORT - - -   Pain Score  - 0 -        Lab Visit on 10/07/2022   Component Date Value Ref Range Status    WBC 10/07/2022 5.27  3.90 - 12.70 K/uL Final    RBC 10/07/2022 3.95 (L)  4.00 - 5.40 M/uL Final    Hemoglobin 10/07/2022 11.3 (L)  12.0 - 16.0 g/dL Final    Hematocrit 10/07/2022 37.0  37.0 - 48.5 % Final    MCV 10/07/2022 94  82 - 98 fL Final    MCH 10/07/2022 28.6  27.0 - 31.0 pg Final    MCHC 10/07/2022 30.5 (L)  32.0 - 36.0 g/dL Final    RDW 10/07/2022 14.5  11.5 - 14.5 % Final    Platelets 10/07/2022 205  150 - 450 K/uL Final    MPV 10/07/2022 11.1  9.2 - 12.9 fL Final    Immature Granulocytes 10/07/2022 0.4  0.0 - 0.5 % Final    Gran # (ANC) 10/07/2022 3.8  1.8 - 7.7 K/uL Final    Immature Grans (Abs) 10/07/2022 0.02  0.00 - 0.04 K/uL Final    Lymph # 10/07/2022 0.7 (L)  1.0 - 4.8 K/uL Final    Mono # 10/07/2022 0.5  0.3 - 1.0 K/uL Final    Eos # 10/07/2022 0.2  0.0 - 0.5 K/uL Final    Baso # 10/07/2022 0.02  0.00 - 0.20 K/uL Final    nRBC 10/07/2022 0  0 /100 WBC Final    Gran % 10/07/2022 72.5  38.0 - 73.0 % Final    Lymph % 10/07/2022 14.0 (L)  18.0 - 48.0 % Final    Mono % 10/07/2022 9.1  4.0 - 15.0 % Final    Eosinophil % 10/07/2022 3.6  0.0 - 8.0 % Final    Basophil % 10/07/2022 0.4  0.0 - 1.9 % Final    Differential Method 10/07/2022 Automated   Final    Sodium 10/07/2022 138  136 - 145 mmol/L Final    Potassium 10/07/2022 4.4  3.5 - 5.1  mmol/L Final    Chloride 10/07/2022 107  95 - 110 mmol/L Final    CO2 10/07/2022 23  23 - 29 mmol/L Final    Glucose 10/07/2022 83  70 - 110 mg/dL Final    BUN 10/07/2022 27 (H)  8 - 23 mg/dL Final    Creatinine 10/07/2022 1.1  0.5 - 1.4 mg/dL Final    Calcium 10/07/2022 8.7  8.7 - 10.5 mg/dL Final    Total Protein 10/07/2022 7.1  6.0 - 8.4 g/dL Final    Albumin 10/07/2022 3.8  3.5 - 5.2 g/dL Final    Total Bilirubin 10/07/2022 0.7  0.1 - 1.0 mg/dL Final    Alkaline Phosphatase 10/07/2022 94  55 - 135 U/L Final    AST 10/07/2022 19  10 - 40 U/L Final    ALT 10/07/2022 15  10 - 44 U/L Final    Anion Gap 10/07/2022 8  8 - 16 mmol/L Final    eGFR 10/07/2022 48.0 (A)  >60 mL/min/1.73 m^2 Final       Past Medical History:   Diagnosis Date    Anemia     Cancer     colon, removed all but 12 in    Cataract     Dementia      Past Surgical History:   Procedure Laterality Date    APPENDECTOMY      COLON SURGERY      EYE SURGERY      HERNIA REPAIR      HYSTERECTOMY      tumor removed  2014     Family History   Problem Relation Age of Onset    Arthritis Mother     Depression Mother     Emphysema Father     Hearing loss Maternal Grandmother        Marital Status:   Alcohol History:  reports current alcohol use of about 1.0 standard drink per week.  Tobacco History:  reports that she has never smoked. She has never used smokeless tobacco.  Drug History:  reports no history of drug use.    Review of patient's allergies indicates:  No Known Allergies    Current Outpatient Medications:     Lactobacillus rhamnosus GG (CULTURELLE) 10 billion cell capsule, Take 1 capsule by mouth once daily., Disp: , Rfl:     multivitamin with minerals tablet, Take 1 tablet by mouth once daily., Disp: , Rfl:     cholestyramine (QUESTRAN) 4 gram packet, Take 1 packet (4 g total) by mouth 3 (three) times daily with meals., Disp: 270 packet, Rfl: 3    UNABLE TO FIND, cbd gummy, Disp: , Rfl:     Review of Systems   Constitutional:  Negative for  activity change, appetite change, chills, fatigue, fever and unexpected weight change.   HENT:  Negative for congestion, ear pain, hearing loss, postnasal drip, sinus pain, sneezing, sore throat, tinnitus and trouble swallowing.    Eyes:  Negative for pain, discharge and visual disturbance.   Respiratory:  Negative for cough, choking, shortness of breath and wheezing.    Cardiovascular:  Negative for chest pain, palpitations and leg swelling.   Gastrointestinal:  Negative for abdominal distention, abdominal pain, blood in stool, constipation, diarrhea, nausea and vomiting.   Endocrine: Negative for cold intolerance and heat intolerance.   Genitourinary:  Negative for difficulty urinating, dysuria, frequency, pelvic pain and urgency.   Musculoskeletal:  Negative for back pain, joint swelling and neck pain.   Skin:  Negative for pallor and rash.   Allergic/Immunologic: Negative for environmental allergies and food allergies.   Neurological:  Negative for dizziness, tremors, weakness, numbness and headaches.   Hematological:  Does not bruise/bleed easily.   Psychiatric/Behavioral:  Negative for agitation, confusion, dysphoric mood, sleep disturbance and suicidal ideas. The patient is not nervous/anxious.         Objective:      Vitals    Vitals - 1 value per visit 9/21/2022 2/23/2023 2/23/2023 3/21/2023 3/21/2023   SYSTOLIC 120 - - - 124   DIASTOLIC 62 - - - 76   Pulse 68 - - - 82   Temp 98.7 - - - 98.2   Resp 12 - - - 12   SPO2 97 - - - 97   Weight (lb) 89.8 - 89 - 92.2   Weight (kg) 40.733 - 40.37 - 41.822   Height 63 - 63 - 63   BMI (Calculated) 15.9 - 15.8 - 16.3   VISIT REPORT - - - - -   Pain Score  - 6 - 0 -       Physical Exam      Assessment:       1. Chronic diarrhea    2. Vascular dementia without behavioral disturbance, psychotic disturbance, mood disturbance, or anxiety, unspecified dementia severity    3. Senile dementia, uncomplicated    4. Stage 3a chronic kidney disease    5. 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 Ulcer of left foot, limited to breakdown of skin    7. Hip pain         Plan:       Chronic diarrhea  -     cholestyramine (QUESTRAN) 4 gram packet; Take 1 packet (4 g total) by mouth 3 (three) times daily with meals.  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        3/21/2023 Faith Nichole M.D.

## 2023-03-21 ENCOUNTER — OFFICE VISIT (OUTPATIENT)
Dept: FAMILY MEDICINE | Facility: CLINIC | Age: 88
End: 2023-03-21
Payer: MEDICARE

## 2023-03-21 VITALS
OXYGEN SATURATION: 97 % | BODY MASS INDEX: 16.34 KG/M2 | TEMPERATURE: 98 F | RESPIRATION RATE: 12 BRPM | HEART RATE: 82 BPM | SYSTOLIC BLOOD PRESSURE: 124 MMHG | HEIGHT: 63 IN | WEIGHT: 92.19 LBS | DIASTOLIC BLOOD PRESSURE: 76 MMHG

## 2023-03-21 DIAGNOSIS — F01.50 VASCULAR DEMENTIA WITHOUT BEHAVIORAL DISTURBANCE, PSYCHOTIC DISTURBANCE, MOOD DISTURBANCE, OR ANXIETY, UNSPECIFIED DEMENTIA SEVERITY: ICD-10-CM

## 2023-03-21 DIAGNOSIS — K52.9 CHRONIC DIARRHEA: Primary | ICD-10-CM

## 2023-03-21 DIAGNOSIS — L97.521 ULCER OF LEFT FOOT, LIMITED TO BREAKDOWN OF SKIN: ICD-10-CM

## 2023-03-21 DIAGNOSIS — M25.559 HIP PAIN: ICD-10-CM

## 2023-03-21 DIAGNOSIS — F03.90 SENILE DEMENTIA, UNCOMPLICATED: ICD-10-CM

## 2023-03-21 DIAGNOSIS — N18.31 STAGE 3A CHRONIC KIDNEY DISEASE: ICD-10-CM

## 2023-03-21 DIAGNOSIS — C77.5 SECONDARY AND UNSPECIFIED MALIGNANT NEOPLASM OF INTRAPELVIC LYMPH NODES: ICD-10-CM

## 2023-03-21 PROBLEM — N18.32 STAGE 3B CHRONIC KIDNEY DISEASE: Status: ACTIVE | Noted: 2023-03-21

## 2023-03-21 PROCEDURE — 99213 OFFICE O/P EST LOW 20 MIN: CPT | Mod: S$PBB,AQ,, | Performed by: INTERNAL MEDICINE

## 2023-03-21 PROCEDURE — 99213 PR OFFICE/OUTPT VISIT, EST, LEVL III, 20-29 MIN: ICD-10-PCS | Mod: S$PBB,AQ,, | Performed by: INTERNAL MEDICINE

## 2023-03-21 PROCEDURE — 99214 OFFICE O/P EST MOD 30 MIN: CPT | Performed by: INTERNAL MEDICINE

## 2023-03-21 RX ORDER — CHOLESTYRAMINE 4 G/9G
4 POWDER, FOR SUSPENSION ORAL
Qty: 270 PACKET | Refills: 3 | Status: SHIPPED | OUTPATIENT
Start: 2023-03-21 | End: 2024-03-20

## 2023-03-31 ENCOUNTER — HOSPITAL ENCOUNTER (OUTPATIENT)
Dept: RADIOLOGY | Facility: HOSPITAL | Age: 88
Discharge: HOME | End: 2023-03-31
Attending: PHYSICIAN ASSISTANT
Payer: MEDICARE

## 2023-03-31 DIAGNOSIS — M79.89 MASS OF SOFT TISSUE: ICD-10-CM

## 2023-03-31 LAB
CREAT SERPL-MCNC: 1.3 MG/DL (ref 0.5–1.4)
SAMPLE: NORMAL

## 2023-03-31 PROCEDURE — 73723 MRI JOINT LWR EXTR W/O&W/DYE: CPT | Mod: TC,PO,LT

## 2023-03-31 RX ORDER — GADOBUTROL 604.72 MG/ML
4.5 INJECTION INTRAVENOUS
Status: DISCONTINUED | OUTPATIENT
Start: 2023-03-31 | End: 2023-04-01 | Stop reason: HOSPADM

## 2023-04-04 ENCOUNTER — TELEPHONE (OUTPATIENT)
Dept: FAMILY MEDICINE | Facility: CLINIC | Age: 88
End: 2023-04-04

## 2023-04-04 NOTE — TELEPHONE ENCOUNTER
Mr. Mcclendon was actually talking about the Lomotil. Which is for diarrhea - as is the Questran.  Mr. Mcclendon instructed she is only to take the Questran. You had changed the Lomotil to Questran because he said the Lomotil was not working.

## 2023-04-04 NOTE — TELEPHONE ENCOUNTER
Pt has been prescribed a powder for incontinence & diarrhea.  She has been taking a pill prior to this.  Does she need to take both?    Questran- powder  Culturelle- capsule    Please advise.

## 2023-06-06 NOTE — TELEPHONE ENCOUNTER
E-Visit Note:    HPI: per patient questionnaire, this has been reviewed  EXAM: n/a    ----------------------------------  1. COVID-19  -     nirmatrelvir/ritonavir 300/100 (PAXLOVID) 20 x 150 MG & 10 x 100MG TBPK; Take 3 tablets (two 150 mg nirmatrelvir and one 100 mg ritonavir tablets) by mouth every 12 hours for 5 days. , Disp-30 tablet, R-0Normal     PLAN:   Moderate symptoms and high risk for complications (age & SLE), so will start on anti-virals. ----------------------------------  The patient was advised to call if these symptoms worsen or fail to improve as anticipated. EV1 - 5-10 minutes were spent on the digital evaluation and management of this patient.     Dany Verdugo MD Pt completely out of medication. Please send to [facility] at [phone]. Last script went to print in error

## 2023-09-17 NOTE — PROGRESS NOTES
SUBJECTIVE:    Patient ID: Morena Mcclendon is a 89 y.o. female.    Chief Complaint: Weight Check (Flu vaccine declined) and Follow-up    HPI    Patient is here for recheck appointment-her son says she went thru a phase where she did not eat but in the last month she has eaten more-she remains with incontinence-    Patient is having some behavior issues rarely-she now has sitters every day so she is bathing regularly-    We reviewed the MRI of the left hip which showed a calcified mass from some old trauma in the left hip        Hospital Outpatient Visit on 03/31/2023   Component Date Value Ref Range Status    POC Creatinine 03/31/2023 1.3  0.5 - 1.4 mg/dL Final    Sample 03/31/2023 VENOUS   Final   Lab Visit on 10/07/2022   Component Date Value Ref Range Status    WBC 10/07/2022 5.27  3.90 - 12.70 K/uL Final    RBC 10/07/2022 3.95 (L)  4.00 - 5.40 M/uL Final    Hemoglobin 10/07/2022 11.3 (L)  12.0 - 16.0 g/dL Final    Hematocrit 10/07/2022 37.0  37.0 - 48.5 % Final    MCV 10/07/2022 94  82 - 98 fL Final    MCH 10/07/2022 28.6  27.0 - 31.0 pg Final    MCHC 10/07/2022 30.5 (L)  32.0 - 36.0 g/dL Final    RDW 10/07/2022 14.5  11.5 - 14.5 % Final    Platelets 10/07/2022 205  150 - 450 K/uL Final    MPV 10/07/2022 11.1  9.2 - 12.9 fL Final    Immature Granulocytes 10/07/2022 0.4  0.0 - 0.5 % Final    Gran # (ANC) 10/07/2022 3.8  1.8 - 7.7 K/uL Final    Immature Grans (Abs) 10/07/2022 0.02  0.00 - 0.04 K/uL Final    Lymph # 10/07/2022 0.7 (L)  1.0 - 4.8 K/uL Final    Mono # 10/07/2022 0.5  0.3 - 1.0 K/uL Final    Eos # 10/07/2022 0.2  0.0 - 0.5 K/uL Final    Baso # 10/07/2022 0.02  0.00 - 0.20 K/uL Final    nRBC 10/07/2022 0  0 /100 WBC Final    Gran % 10/07/2022 72.5  38.0 - 73.0 % Final    Lymph % 10/07/2022 14.0 (L)  18.0 - 48.0 % Final    Mono % 10/07/2022 9.1  4.0 - 15.0 % Final    Eosinophil % 10/07/2022 3.6  0.0 - 8.0 % Final    Basophil % 10/07/2022 0.4  0.0 - 1.9 % Final    Differential Method 10/07/2022  Automated   Final    Sodium 10/07/2022 138  136 - 145 mmol/L Final    Potassium 10/07/2022 4.4  3.5 - 5.1 mmol/L Final    Chloride 10/07/2022 107  95 - 110 mmol/L Final    CO2 10/07/2022 23  23 - 29 mmol/L Final    Glucose 10/07/2022 83  70 - 110 mg/dL Final    BUN 10/07/2022 27 (H)  8 - 23 mg/dL Final    Creatinine 10/07/2022 1.1  0.5 - 1.4 mg/dL Final    Calcium 10/07/2022 8.7  8.7 - 10.5 mg/dL Final    Total Protein 10/07/2022 7.1  6.0 - 8.4 g/dL Final    Albumin 10/07/2022 3.8  3.5 - 5.2 g/dL Final    Total Bilirubin 10/07/2022 0.7  0.1 - 1.0 mg/dL Final    Alkaline Phosphatase 10/07/2022 94  55 - 135 U/L Final    AST 10/07/2022 19  10 - 40 U/L Final    ALT 10/07/2022 15  10 - 44 U/L Final    Anion Gap 10/07/2022 8  8 - 16 mmol/L Final    eGFR 10/07/2022 48.0 (A)  >60 mL/min/1.73 m^2 Final       Past Medical History:   Diagnosis Date    Anemia     Cancer     colon, removed all but 12 in    Cataract     Dementia      Past Surgical History:   Procedure Laterality Date    APPENDECTOMY      COLON SURGERY      EYE SURGERY      HERNIA REPAIR      HYSTERECTOMY      tumor removed  2014     Family History   Problem Relation Age of Onset    Arthritis Mother     Depression Mother     Emphysema Father     Hearing loss Maternal Grandmother        Marital Status:   Alcohol History:  reports current alcohol use of about 1.0 standard drink of alcohol per week.  Tobacco History:  reports that she has never smoked. She has never used smokeless tobacco.  Drug History:  reports no history of drug use.    Review of patient's allergies indicates:  No Known Allergies    Current Outpatient Medications:     Lactobacillus rhamnosus GG (CULTURELLE) 10 billion cell capsule, Take 1 capsule by mouth once daily., Disp: , Rfl:     multivitamin with minerals tablet, Take 1 tablet by mouth once daily., Disp: , Rfl:     UNABLE TO FIND, cbd gummy, Disp: , Rfl:     cholestyramine (QUESTRAN) 4 gram packet, Take 1 packet (4 g total) by mouth 3  (three) times daily with meals. (Patient not taking: Reported on 9/19/2023), Disp: 270 packet, Rfl: 3    Review of Systems   Constitutional:  Positive for appetite change. Negative for chills, diaphoresis, fatigue, fever and unexpected weight change.   HENT:  Negative for congestion, ear pain, hearing loss, nosebleeds, postnasal drip, sinus pressure, sinus pain, sneezing, sore throat, tinnitus, trouble swallowing and voice change.    Eyes:  Negative for photophobia, pain, itching and visual disturbance.   Respiratory:  Negative for apnea, cough, chest tightness, shortness of breath, wheezing and stridor.    Cardiovascular:  Negative for chest pain, palpitations and leg swelling.   Gastrointestinal:  Negative for abdominal distention, abdominal pain, blood in stool, constipation, diarrhea, nausea and vomiting.        Stool incontinence   Endocrine: Negative for cold intolerance, heat intolerance, polydipsia and polyuria.   Genitourinary:  Negative for difficulty urinating, dyspareunia, dysuria, flank pain, frequency, hematuria, menstrual problem, pelvic pain, urgency, vaginal discharge and vaginal pain.   Musculoskeletal:  Negative for arthralgias, back pain, joint swelling, myalgias, neck pain and neck stiffness.   Skin:  Negative for pallor.   Allergic/Immunologic: Negative for environmental allergies and food allergies.   Neurological:  Negative for dizziness, tremors, speech difficulty, weakness, light-headedness and numbness.   Hematological:  Does not bruise/bleed easily.   Psychiatric/Behavioral:  Negative for agitation, confusion, decreased concentration, sleep disturbance and suicidal ideas. The patient is not nervous/anxious.           Objective:          4/8/2021     3:08 PM 4/26/2021     2:46 PM 9/21/2022     4:58 PM 2/23/2023    12:31 PM 3/21/2023     3:53 PM 9/19/2023     3:48 PM   Vitals - 1 value per visit   SYSTOLIC 104 105 120  124 130   DIASTOLIC 62 64 62  76 84   Pulse 62 60 68  82 92   Temp 98.6  "°F (37 °C)  98.7 °F (37.1 °C)  98.2 °F (36.8 °C)    Resp 14 16 12  12 12   SPO2 99 % 98 % 97 %  97 % 100 %   Weight (lb) 95.8 95 89.8 89 92.2 87.8   Weight (kg) 43.455 43.092 40.733 40.37 41.822 39.826   Height 5' 3" (1.6 m) 5' 3" (1.6 m) 5' 3" (1.6 m) 5' 3" (1.6 m) 5' 3" (1.6 m) 5' 3" (1.6 m)   BMI (Calculated) 17 16.8 15.9 15.8 16.3 15.6   Pain Score Zero Two Zero Six Zero Zero   (    Physical Exam  Constitutional:       Appearance: Normal appearance.   HENT:      Head: Normocephalic and atraumatic.   Eyes:      Extraocular Movements: Extraocular movements intact.      Pupils: Pupils are equal, round, and reactive to light.   Neck:      Vascular: No carotid bruit.   Cardiovascular:      Rate and Rhythm: Normal rate and regular rhythm.      Pulses: Normal pulses.      Heart sounds: Normal heart sounds.   Pulmonary:      Effort: Pulmonary effort is normal. No respiratory distress.      Breath sounds: Normal breath sounds. No stridor. No wheezing or rhonchi.   Abdominal:      General: Bowel sounds are normal.      Palpations: Abdomen is soft.   Musculoskeletal:      Right lower leg: No edema.      Left lower leg: No edema.   Lymphadenopathy:      Cervical: No cervical adenopathy.   Skin:     General: Skin is warm and dry.      Capillary Refill: Capillary refill takes less than 2 seconds.   Neurological:      Mental Status: She is alert.      Motor: No weakness.      Gait: Gait normal.      Comments: Quiet and well behaved today   Psychiatric:      Comments: Pleasant little lady           Assessment:       1. Vascular dementia without behavioral disturbance, psychotic disturbance, mood disturbance, or anxiety, unspecified dementia severity    2. Incontinence of feces, unspecified fecal incontinence type         Plan:       Vascular dementia without behavioral disturbance, psychotic disturbance, mood disturbance, or anxiety, unspecified dementia severity    Incontinence of feces, unspecified fecal incontinence " type      No follow-ups on file.        9/19/2023 Faith Nichole M.D.

## 2023-09-19 ENCOUNTER — OFFICE VISIT (OUTPATIENT)
Dept: FAMILY MEDICINE | Facility: CLINIC | Age: 88
End: 2023-09-19
Payer: MEDICARE

## 2023-09-19 VITALS
HEART RATE: 92 BPM | WEIGHT: 87.81 LBS | DIASTOLIC BLOOD PRESSURE: 84 MMHG | SYSTOLIC BLOOD PRESSURE: 130 MMHG | RESPIRATION RATE: 12 BRPM | BODY MASS INDEX: 15.56 KG/M2 | OXYGEN SATURATION: 100 % | HEIGHT: 63 IN

## 2023-09-19 DIAGNOSIS — F01.50 VASCULAR DEMENTIA WITHOUT BEHAVIORAL DISTURBANCE, PSYCHOTIC DISTURBANCE, MOOD DISTURBANCE, OR ANXIETY, UNSPECIFIED DEMENTIA SEVERITY: Primary | ICD-10-CM

## 2023-09-19 DIAGNOSIS — R15.9 INCONTINENCE OF FECES, UNSPECIFIED FECAL INCONTINENCE TYPE: ICD-10-CM

## 2023-09-19 PROCEDURE — 90662 IIV NO PRSV INCREASED AG IM: CPT | Mod: PBBFAC | Performed by: INTERNAL MEDICINE

## 2023-09-19 PROCEDURE — 99213 OFFICE O/P EST LOW 20 MIN: CPT | Mod: 25,S$PBB,AQ, | Performed by: INTERNAL MEDICINE

## 2023-09-19 PROCEDURE — 99214 OFFICE O/P EST MOD 30 MIN: CPT | Performed by: INTERNAL MEDICINE

## 2023-09-19 PROCEDURE — 99999PBSHW FLU VACCINE - QUADRIVALENT - HIGH DOSE (65+) PRESERVATIVE FREE IM: ICD-10-PCS | Mod: PBBFAC,,,

## 2023-09-19 PROCEDURE — 99999PBSHW FLU VACCINE - QUADRIVALENT - HIGH DOSE (65+) PRESERVATIVE FREE IM: Mod: PBBFAC,,,

## 2023-09-19 PROCEDURE — 99213 PR OFFICE/OUTPT VISIT, EST, LEVL III, 20-29 MIN: ICD-10-PCS | Mod: 25,S$PBB,AQ, | Performed by: INTERNAL MEDICINE

## 2023-10-08 NOTE — PROGRESS NOTES
SUBJECTIVE:    Patient ID: Morena Mcclendon is a 89 y.o. female.    Chief Complaint: Bleeding/Bruising (Bruise above right ankle)    HPI    Son brings his mother in -he noted she had some discoloration with swelling up to the top of the soleus muscle associated with some swelling and her sitter put some sock that were a bit tight on and at the ankle when taken off there was some discoloration at the end of a widespread hematoma spreading under thin skin-a darker bruise like discoloration across the anterior lower right leg just above the ankle with some swelling just below the sock line -there was no known trauma or area that looked like same but the son had noted an area just above the beginning of the discoloration that looked migdalia it had been an open small wound which had keratinized over-there was no fever and the patient had not complained of pain    Hospital Outpatient Visit on 03/31/2023   Component Date Value Ref Range Status    POC Creatinine 03/31/2023 1.3  0.5 - 1.4 mg/dL Final    Sample 03/31/2023 VENOUS   Final       Past Medical History:   Diagnosis Date    Anemia     Cancer     colon, removed all but 12 in    Cataract     Dementia      Past Surgical History:   Procedure Laterality Date    APPENDECTOMY      COLON SURGERY      EYE SURGERY      HERNIA REPAIR      HYSTERECTOMY      tumor removed  2014     Family History   Problem Relation Age of Onset    Arthritis Mother     Depression Mother     Emphysema Father     Hearing loss Maternal Grandmother        Marital Status:   Alcohol History:  reports current alcohol use of about 1.0 standard drink of alcohol per week.  Tobacco History:  reports that she has never smoked. She has never used smokeless tobacco.  Drug History:  reports no history of drug use.    Review of patient's allergies indicates:  No Known Allergies    Current Outpatient Medications:     Lactobacillus rhamnosus GG (CULTURELLE) 10 billion cell capsule, Take 1 capsule by mouth  once daily., Disp: , Rfl:     multivitamin with minerals tablet, Take 1 tablet by mouth once daily., Disp: , Rfl:     UNABLE TO FIND, cbd gummy, Disp: , Rfl:     cholestyramine (QUESTRAN) 4 gram packet, Take 1 packet (4 g total) by mouth 3 (three) times daily with meals. (Patient not taking: Reported on 9/19/2023), Disp: 270 packet, Rfl: 3    Review of Systems   Constitutional:  Negative for activity change, appetite change, chills, fatigue, fever and unexpected weight change.   HENT:  Negative for congestion, ear pain, hearing loss, postnasal drip, sinus pain, sneezing, sore throat and trouble swallowing.    Eyes:  Negative for pain, discharge and visual disturbance.   Respiratory:  Negative for cough, choking, shortness of breath and wheezing.    Cardiovascular:  Negative for chest pain, palpitations and leg swelling.   Gastrointestinal:  Negative for abdominal distention, abdominal pain, blood in stool, constipation, diarrhea, nausea and vomiting.   Endocrine: Negative for cold intolerance and heat intolerance.   Genitourinary:  Negative for difficulty urinating, dysuria, frequency, pelvic pain and urgency.   Musculoskeletal:  Negative for back pain, joint swelling and neck pain.   Skin:  Negative for pallor and rash.   Allergic/Immunologic: Negative for environmental allergies and food allergies.   Neurological:  Negative for dizziness, tremors, weakness, numbness and headaches.        See Psych   Hematological:  Does not bruise/bleed easily.   Psychiatric/Behavioral:  Negative for agitation, confusion, dysphoric mood, sleep disturbance and suicidal ideas. The patient is not nervous/anxious.         Dementia without behavior disturbance          Objective:          4/26/2021     2:46 PM 9/21/2022     4:58 PM 2/23/2023    12:31 PM 3/21/2023     3:53 PM 9/19/2023     3:48 PM 10/9/2023     1:34 PM   Vitals - 1 value per visit   SYSTOLIC 105 120  124 130 132   DIASTOLIC 64 62  76 84 84   Pulse 60 68  82 92 82   Temp  " 98.7 °F (37.1 °C)  98.2 °F (36.8 °C)  98.6 °F (37 °C)   Resp 16 12  12 12 12   SPO2 98 % 97 %  97 % 100 %    Weight (lb) 95 89.8 89 92.2 87.8 89   Weight (kg) 43.092 40.733 40.37 41.822 39.826 40.37   Height 5' 3" (1.6 m) 5' 3" (1.6 m) 5' 3" (1.6 m) 5' 3" (1.6 m) 5' 3" (1.6 m) 5' 3" (1.6 m)   BMI (Calculated) 16.8 15.9 15.8 16.3 15.6 15.8   Pain Score Two Zero Six Zero Zero Zero   (    Physical Exam  Constitutional:       Comments: Thin little lady, pleasantly confused-cannot converse with me   HENT:      Head: Normocephalic and atraumatic.   Eyes:      Comments: Dry greenish exudate lateral right eyelid-upper   Cardiovascular:      Rate and Rhythm: Normal rate and regular rhythm.   Skin:     Comments: On the right lower extremity senior care up from the ankle is a spreading superficial hematoma under very senile epidermis and there is an area of deeper bluish discoloration on the anterior surface of the lower ext just above the ankle and and area of deeper erythema toward the dorsal aspect of this contusion on the dorsolateral aspect of this area-there is evidence of some gravitational spread of the hematoma already, as well as some minimal edema just distal to the anterior deep blue discoloration over the lower right leg-pulse is palpable in the foot-there is no warmth or sign of infection of the lower extremity-no break in the skin is identified -there is an old healing lesion which is very small that has keratinized just above the contusion anterior surface of the lower leg which is not inflammed or tender           Assessment:       1. Contusion of lower leg, initial encounter    2. Senile purpura         Plan:       Contusion of lower leg, initial encounter        -     If. In the process of resolution of this process, there develops worsening erythema of the leg or heat or tenderness the son is instructed to call for antibiotic and possible recheck    Senile purpura      Follow up if symptoms worsen or fail to " improve, for FOLLOW-UP STATUS.        10/9/2023 Faith Nichole M.D.

## 2023-10-09 ENCOUNTER — OFFICE VISIT (OUTPATIENT)
Dept: FAMILY MEDICINE | Facility: CLINIC | Age: 88
End: 2023-10-09
Payer: MEDICARE

## 2023-10-09 VITALS
SYSTOLIC BLOOD PRESSURE: 132 MMHG | TEMPERATURE: 99 F | BODY MASS INDEX: 15.77 KG/M2 | RESPIRATION RATE: 12 BRPM | HEIGHT: 63 IN | HEART RATE: 82 BPM | DIASTOLIC BLOOD PRESSURE: 84 MMHG | WEIGHT: 89 LBS

## 2023-10-09 DIAGNOSIS — D69.2 SENILE PURPURA: ICD-10-CM

## 2023-10-09 DIAGNOSIS — S80.10XA CONTUSION OF LOWER LEG, INITIAL ENCOUNTER: Primary | ICD-10-CM

## 2023-10-09 PROCEDURE — 99213 OFFICE O/P EST LOW 20 MIN: CPT | Mod: S$PBB,AQ,, | Performed by: INTERNAL MEDICINE

## 2023-10-09 PROCEDURE — 99213 OFFICE O/P EST LOW 20 MIN: CPT | Performed by: INTERNAL MEDICINE

## 2023-10-09 PROCEDURE — 99213 PR OFFICE/OUTPT VISIT, EST, LEVL III, 20-29 MIN: ICD-10-PCS | Mod: S$PBB,AQ,, | Performed by: INTERNAL MEDICINE

## 2024-02-05 ENCOUNTER — TELEPHONE (OUTPATIENT)
Dept: FAMILY MEDICINE | Facility: CLINIC | Age: 89
End: 2024-02-05
Payer: MEDICARE

## 2024-02-05 NOTE — TELEPHONE ENCOUNTER
Kevin Mcclendon notified need to send picture of the spot on Mrs. Berg's rump   Mode of arrival (squad #, walk in, police, etc) : walk in        Chief complaint(s): bee sting        Arrival Note (brief scenario, treatment PTA, etc). : Pt was stung by a bee on her right foot. Pt states her leg will go numb due to an allergy. Pt deneis SOB, cough, tongue sweling. Pt does not have an EpiPen, but states \"they just give me a shot. \" Pt is A&Ox4, in no acute distress, respirations even and unlabored, ambulatory with steady gait. C= \"Have you ever felt that you should Cut down on your drinking? \"  No  A= \"Have people Annoyed you by criticizing your drinking? \"  No  G= \"Have you ever felt bad or Guilty about your drinking? \"  Yes  E= \"Have you ever had a drink as an Eye-opener first thing in the morning to steady your nerves or to help a hangover? \"  No      Deferred []      Reason for deferring: N/A    *If yes to two or more: probable alcohol abuse. *

## 2024-02-06 RX ORDER — MUPIROCIN 20 MG/G
OINTMENT TOPICAL
COMMUNITY
End: 2024-02-06 | Stop reason: SDUPTHER

## 2024-02-06 RX ORDER — MUPIROCIN 20 MG/G
OINTMENT TOPICAL 2 TIMES DAILY
Qty: 15 G | Refills: 2 | Status: SHIPPED | OUTPATIENT
Start: 2024-02-06

## 2024-02-06 RX ORDER — CEFUROXIME AXETIL 500 MG/1
500 TABLET ORAL 2 TIMES DAILY
Qty: 20 TABLET | Refills: 0 | Status: SHIPPED | OUTPATIENT
Start: 2024-02-06 | End: 2024-02-16

## 2024-04-04 ENCOUNTER — OFFICE VISIT (OUTPATIENT)
Dept: FAMILY MEDICINE | Facility: CLINIC | Age: 89
End: 2024-04-04
Payer: MEDICARE

## 2024-04-04 VITALS
OXYGEN SATURATION: 98 % | WEIGHT: 89 LBS | SYSTOLIC BLOOD PRESSURE: 100 MMHG | DIASTOLIC BLOOD PRESSURE: 74 MMHG | TEMPERATURE: 98 F | HEART RATE: 80 BPM | HEIGHT: 63 IN | RESPIRATION RATE: 18 BRPM | BODY MASS INDEX: 15.77 KG/M2

## 2024-04-04 DIAGNOSIS — E78.00 PURE HYPERCHOLESTEROLEMIA: ICD-10-CM

## 2024-04-04 DIAGNOSIS — N18.9 CHRONIC RENAL IMPAIRMENT, UNSPECIFIED CKD STAGE: ICD-10-CM

## 2024-04-04 DIAGNOSIS — C18.2 MALIGNANT NEOPLASM OF ASCENDING COLON: ICD-10-CM

## 2024-04-04 DIAGNOSIS — Z78.0 MENOPAUSE: ICD-10-CM

## 2024-04-04 DIAGNOSIS — N18.32 STAGE 3B CHRONIC KIDNEY DISEASE: Primary | ICD-10-CM

## 2024-04-04 PROBLEM — C18.9 COLON CANCER: Status: ACTIVE | Noted: 2024-04-04

## 2024-04-04 PROCEDURE — 99999 PR PBB SHADOW E&M-EST. PATIENT-LVL III: CPT | Mod: PBBFAC,,, | Performed by: FAMILY MEDICINE

## 2024-04-04 PROCEDURE — 99213 OFFICE O/P EST LOW 20 MIN: CPT | Mod: S$PBB,AQ,, | Performed by: FAMILY MEDICINE

## 2024-04-04 PROCEDURE — 99213 OFFICE O/P EST LOW 20 MIN: CPT | Mod: PBBFAC,PN | Performed by: FAMILY MEDICINE

## 2024-04-04 NOTE — PROGRESS NOTES
Subjective:       Patient ID: Morena Mcclendon is a 89 y.o. female.    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     Ulcer of left foot, limited to breakdown of skin    Wt Readings from Last 4 Encounters:  04/04/24 : 40.4 kg (89 lb)  10/09/23 : 40.4 kg (89 lb)  09/19/23 : 39.8 kg (87 lb 12.8 oz)  03/21/23 : 41.8 kg (92 lb 3.2 oz)  Lab Results       Component                Value               Date                       WBC                      5.27                10/07/2022                 HGB                      11.3 (L)            10/07/2022                 HCT                      37.0                10/07/2022                 PLT                      205                 10/07/2022                 ALT                      15                  10/07/2022                 AST                      19                  10/07/2022                 NA                       138                 10/07/2022                 K                        4.4                 10/07/2022                 CL                       107                 10/07/2022                 CREATININE               1.1                 10/07/2022                 BUN                      27 (H)              10/07/2022                 CO2                      23                  10/07/2022            BP Readings from Last 3 Encounters:  04/04/24 : (!) 172/94  10/09/23 : 132/84  09/19/23 : 130/84              Allergies and Medications:   Review of patient's allergies indicates:  No Known Allergies  Current Outpatient Medications   Medication Sig Dispense Refill    Lactobacillus rhamnosus GG (CULTURELLE) 10 billion cell capsule Take 1 capsule by mouth once daily.      multivitamin with minerals tablet Take 1 tablet by mouth once daily.       mupirocin (BACTROBAN) 2 % ointment Apply topically 2 (two) times daily. Apply topically to affected area three times a day 15 g 2    UNABLE TO FIND cbd gummy      cholestyramine (QUESTRAN) 4 gram packet Take 1 packet (4 g total) by mouth 3 (three) times daily with meals. (Patient not taking: Reported on 9/19/2023) 270 packet 3     No current facility-administered medications for this visit.       Family History:   Family History   Problem Relation Age of Onset    Arthritis Mother     Depression Mother     Emphysema Father     Hearing loss Maternal Grandmother        Social History:   Social History     Socioeconomic History    Marital status:    Tobacco Use    Smoking status: Never    Smokeless tobacco: Never   Substance and Sexual Activity    Alcohol use: Yes     Alcohol/week: 1.0 standard drink of alcohol     Types: 1 Glasses of wine per week     Comment: daily    Drug use: No    Sexual activity: Not Currently       Review of Systems    Objective:     Vitals:    04/04/24 1447   BP: (!) 172/94   Pulse: 80   Resp: 18   Temp: 98.3 °F (36.8 °C)        Physical Exam    Assessment:       1. Stage 3b chronic kidney disease    2. Pure hypercholesterolemia    3. Malignant neoplasm of ascending colon    4. Menopause    5. Chronic renal impairment, unspecified CKD stage      Elevated blood pressure today 1st time and perhaps incidental we will recheck prior to discharge  Plan:       Morena was seen today for weight check.    Diagnoses and all orders for this visit:    Stage 3b chronic kidney disease    Pure hypercholesterolemia  -     Lipid Panel; Future  -     Comprehensive Metabolic Panel; Future    Malignant neoplasm of ascending colon    Menopause  -     DXA Bone Density Axial Skeleton 1 or more sites; Future    Chronic renal impairment, unspecified CKD stage  -     Microalbumin/Creatinine Ratio, Urine; Future         Follow up in about 2 weeks (around 4/18/2024) for bp recheck nursing visit.

## 2024-04-22 ENCOUNTER — HOSPITAL ENCOUNTER (OUTPATIENT)
Dept: RADIOLOGY | Facility: HOSPITAL | Age: 89
Discharge: HOME | End: 2024-04-22
Attending: FAMILY MEDICINE
Payer: MEDICARE

## 2024-04-22 DIAGNOSIS — M81.0 AGE-RELATED OSTEOPOROSIS WITHOUT CURRENT PATHOLOGICAL FRACTURE: Primary | ICD-10-CM

## 2024-04-22 DIAGNOSIS — Z78.0 MENOPAUSE: ICD-10-CM

## 2024-04-22 PROCEDURE — 77080 DXA BONE DENSITY AXIAL: CPT | Mod: 26,,, | Performed by: RADIOLOGY

## 2024-04-22 PROCEDURE — 77080 DXA BONE DENSITY AXIAL: CPT | Mod: TC,PO

## 2024-04-22 RX ORDER — IBANDRONATE SODIUM 150 MG/1
150 TABLET, FILM COATED ORAL
Qty: 1 TABLET | Refills: 11 | Status: SHIPPED | OUTPATIENT
Start: 2024-04-22 | End: 2025-04-22

## 2024-04-22 RX ORDER — LYSINE HCL 500 MG
1 TABLET ORAL DAILY
Qty: 90 TABLET | Refills: 3 | Status: SHIPPED | OUTPATIENT
Start: 2024-04-22 | End: 2025-04-22

## 2024-04-24 ENCOUNTER — TELEPHONE (OUTPATIENT)
Dept: FAMILY MEDICINE | Facility: CLINIC | Age: 89
End: 2024-04-24
Payer: MEDICARE

## 2024-04-24 NOTE — TELEPHONE ENCOUNTER
----- Message from Francisco J Cedillo MD sent at 4/22/2024  4:12 PM CDT -----  The DEXA scan shows osteoporosis I would recommend vitamin-D and calcium and also a bone stimulant called Boniva to take once a month I will send in prescriptions.

## 2024-06-07 NOTE — PROGRESS NOTES
Son called she is having urinary tract symptoms pain on urination.  She does have chronic renal insufficiency and is high risk for urosepsis.  Advised to go to the urgent care for evaluation.

## 2024-06-11 ENCOUNTER — TELEPHONE (OUTPATIENT)
Dept: FAMILY MEDICINE | Facility: CLINIC | Age: 89
End: 2024-06-11
Payer: MEDICARE

## 2024-06-13 ENCOUNTER — TELEPHONE (OUTPATIENT)
Dept: FAMILY MEDICINE | Facility: CLINIC | Age: 89
End: 2024-06-13
Payer: MEDICARE

## 2024-06-13 NOTE — TELEPHONE ENCOUNTER
Patient's caregiver says she cannot walk, is in diapers because of her frequent urinating, she needs a liquid abx, to help. He is encouraged to call 911 to get an evaluation and a plan.

## 2025-01-14 DIAGNOSIS — Z00.00 ENCOUNTER FOR MEDICARE ANNUAL WELLNESS EXAM: ICD-10-CM

## 2025-05-23 DIAGNOSIS — M81.0 AGE-RELATED OSTEOPOROSIS WITHOUT CURRENT PATHOLOGICAL FRACTURE: ICD-10-CM

## 2025-05-23 RX ORDER — IBANDRONATE SODIUM 150 MG/1
TABLET, FILM COATED ORAL
Qty: 1 TABLET | Refills: 11 | OUTPATIENT
Start: 2025-05-23

## 2025-05-24 ENCOUNTER — HOSPITAL ENCOUNTER (INPATIENT)
Facility: HOSPITAL | Age: OVER 89
LOS: 4 days | Discharge: NURSING, REHAB OR LONG-TERM CARE | DRG: 522 | End: 2025-05-28
Attending: STUDENT IN AN ORGANIZED HEALTH CARE EDUCATION/TRAINING PROGRAM | Admitting: INTERNAL MEDICINE
Payer: MEDICARE

## 2025-05-24 DIAGNOSIS — R07.9 CHEST PAIN: ICD-10-CM

## 2025-05-24 DIAGNOSIS — S72.002A LEFT DISPLACED FEMORAL NECK FRACTURE: Primary | ICD-10-CM

## 2025-05-24 DIAGNOSIS — Z01.818 PREOP EXAMINATION: ICD-10-CM

## 2025-05-24 DIAGNOSIS — Z01.818 PRE-OP EXAM: ICD-10-CM

## 2025-05-24 DIAGNOSIS — S72.009A FEMORAL NECK FRACTURE: ICD-10-CM
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
## 2025-05-24 PROBLEM — D64.9 ANEMIA: Status: ACTIVE | Noted: 2025-05-24

## 2025-05-24 PROBLEM — F03.90 DEMENTIA: Status: ACTIVE | Noted: 2025-05-24

## 2025-05-24 LAB
ABORH RETYPE: NORMAL
ABSOLUTE EOSINOPHIL (SMH): 0.03 K/UL
ABSOLUTE MONOCYTE (SMH): 0.69 K/UL (ref 0.3–1)
ABSOLUTE NEUTROPHIL COUNT (SMH): 5.3 K/UL (ref 1.8–7.7)
ALBUMIN SERPL-MCNC: 3.4 G/DL (ref 3.5–5.2)
ALP SERPL-CCNC: 117 UNIT/L (ref 55–135)
ALT SERPL-CCNC: 10 UNIT/L (ref 10–44)
ANION GAP (SMH): 7 MMOL/L (ref 8–16)
APTT PPP: 26.1 SECONDS (ref 21–32)
AST SERPL-CCNC: 11 UNIT/L (ref 10–40)
BASOPHILS # BLD AUTO: 0.02 K/UL
BASOPHILS NFR BLD AUTO: 0.3 %
BILIRUB SERPL-MCNC: 0.4 MG/DL (ref 0.1–1)
BNP SERPL-MCNC: 245 PG/ML
BUN SERPL-MCNC: 31 MG/DL (ref 10–30)
CALCIUM SERPL-MCNC: 8.3 MG/DL (ref 8.7–10.5)
CHLORIDE SERPL-SCNC: 113 MMOL/L (ref 95–110)
CO2 SERPL-SCNC: 19 MMOL/L (ref 23–29)
CREAT SERPL-MCNC: 1.6 MG/DL (ref 0.5–1.4)
ERYTHROCYTE [DISTWIDTH] IN BLOOD BY AUTOMATED COUNT: 15.6 % (ref 11.5–14.5)
FERRITIN SERPL-MCNC: 158.6 NG/ML (ref 20–300)
GFR SERPLBLD CREATININE-BSD FMLA CKD-EPI: 30 ML/MIN/1.73/M2
GLUCOSE SERPL-MCNC: 113 MG/DL (ref 70–110)
HCT VFR BLD AUTO: 25.8 % (ref 37–48.5)
HGB BLD-MCNC: 7.8 GM/DL (ref 12–16)
IMM GRANULOCYTES # BLD AUTO: 0.04 K/UL (ref 0–0.04)
IMM GRANULOCYTES NFR BLD AUTO: 0.6 % (ref 0–0.5)
INDIRECT COOMBS: NORMAL
INR PPP: 0.9 (ref 0.8–1.2)
IRON SATN MFR SERPL: <10 % (ref 20–50)
IRON SERPL-MCNC: 11 UG/DL (ref 30–160)
LYMPHOCYTES # BLD AUTO: 0.5 K/UL (ref 1–4.8)
MAGNESIUM SERPL-MCNC: 2 MG/DL (ref 1.6–2.6)
MCH RBC QN AUTO: 27.5 PG (ref 27–31)
MCHC RBC AUTO-ENTMCNC: 30.2 G/DL (ref 32–36)
MCV RBC AUTO: 91 FL (ref 82–98)
NUCLEATED RBC (/100WBC) (SMH): 0 /100 WBC
PLATELET # BLD AUTO: 180 K/UL (ref 150–450)
PMV BLD AUTO: 11.2 FL (ref 9.2–12.9)
POTASSIUM SERPL-SCNC: 4.7 MMOL/L (ref 3.5–5.1)
PROT SERPL-MCNC: 6.4 GM/DL (ref 6–8.4)
PROTHROMBIN TIME: 10.5 SECONDS (ref 9–12.5)
RBC # BLD AUTO: 2.84 M/UL (ref 4–5.4)
RELATIVE EOSINOPHIL (SMH): 0.5 % (ref 0–8)
RELATIVE LYMPHOCYTE (SMH): 7.7 % (ref 18–48)
RELATIVE MONOCYTE (SMH): 10.6 % (ref 4–15)
RELATIVE NEUTROPHIL (SMH): 80.3 % (ref 38–73)
RETICS/RBC NFR AUTO: 1.7 % (ref 0.5–2.5)
RH BLD: NORMAL
SODIUM SERPL-SCNC: 139 MMOL/L (ref 136–145)
SPECIMEN OUTDATE: NORMAL
TIBC SERPL-MCNC: 244 UG/DL (ref 250–450)
TRANSFERRIN SERPL-MCNC: 174 MG/DL (ref 200–375)
WBC # BLD AUTO: 6.53 K/UL (ref 3.9–12.7)

## 2025-05-24 PROCEDURE — 82728 ASSAY OF FERRITIN: CPT

## 2025-05-24 PROCEDURE — 80053 COMPREHEN METABOLIC PANEL: CPT

## 2025-05-24 PROCEDURE — 36415 COLL VENOUS BLD VENIPUNCTURE: CPT

## 2025-05-24 PROCEDURE — 86900 BLOOD TYPING SEROLOGIC ABO: CPT

## 2025-05-24 PROCEDURE — 11000001 HC ACUTE MED/SURG PRIVATE ROOM

## 2025-05-24 PROCEDURE — 93005 ELECTROCARDIOGRAM TRACING: CPT | Performed by: GENERAL PRACTICE

## 2025-05-24 PROCEDURE — 85018 HEMOGLOBIN: CPT

## 2025-05-24 PROCEDURE — 85014 HEMATOCRIT: CPT

## 2025-05-24 PROCEDURE — 83540 ASSAY OF IRON: CPT

## 2025-05-24 PROCEDURE — 85610 PROTHROMBIN TIME: CPT

## 2025-05-24 PROCEDURE — 83880 ASSAY OF NATRIURETIC PEPTIDE: CPT

## 2025-05-24 PROCEDURE — 99285 EMERGENCY DEPT VISIT HI MDM: CPT | Mod: 25

## 2025-05-24 PROCEDURE — 63600175 PHARM REV CODE 636 W HCPCS

## 2025-05-24 PROCEDURE — 83735 ASSAY OF MAGNESIUM: CPT

## 2025-05-24 PROCEDURE — 85045 AUTOMATED RETICULOCYTE COUNT: CPT

## 2025-05-24 PROCEDURE — 93010 ELECTROCARDIOGRAM REPORT: CPT | Mod: ,,, | Performed by: GENERAL PRACTICE

## 2025-05-24 PROCEDURE — 25000003 PHARM REV CODE 250: Performed by: STUDENT IN AN ORGANIZED HEALTH CARE EDUCATION/TRAINING PROGRAM

## 2025-05-24 PROCEDURE — 85025 COMPLETE CBC W/AUTO DIFF WBC: CPT

## 2025-05-24 PROCEDURE — 85730 THROMBOPLASTIN TIME PARTIAL: CPT

## 2025-05-24 RX ORDER — SODIUM CHLORIDE 0.9 % (FLUSH) 0.9 %
10 SYRINGE (ML) INJECTION EVERY 12 HOURS PRN
Status: DISCONTINUED | OUTPATIENT
Start: 2025-05-24 | End: 2025-05-28 | Stop reason: HOSPADM

## 2025-05-24 RX ORDER — HYDROCODONE BITARTRATE AND ACETAMINOPHEN 5; 325 MG/1; MG/1
1 TABLET ORAL EVERY 8 HOURS PRN
Refills: 0 | Status: DISCONTINUED | OUTPATIENT
Start: 2025-05-24 | End: 2025-05-25

## 2025-05-24 RX ORDER — TALC
6 POWDER (GRAM) TOPICAL NIGHTLY PRN
Status: DISCONTINUED | OUTPATIENT
Start: 2025-05-24 | End: 2025-05-28 | Stop reason: HOSPADM

## 2025-05-24 RX ORDER — AMOXICILLIN 250 MG
1 CAPSULE ORAL DAILY PRN
Status: DISCONTINUED | OUTPATIENT
Start: 2025-05-24 | End: 2025-05-28 | Stop reason: HOSPADM

## 2025-05-24 RX ORDER — PANTOPRAZOLE SODIUM 40 MG/10ML
40 INJECTION, POWDER, LYOPHILIZED, FOR SOLUTION INTRAVENOUS DAILY
Status: DISCONTINUED | OUTPATIENT
Start: 2025-05-25 | End: 2025-05-26

## 2025-05-24 RX ORDER — SODIUM CHLORIDE, SODIUM LACTATE, POTASSIUM CHLORIDE, CALCIUM CHLORIDE 600; 310; 30; 20 MG/100ML; MG/100ML; MG/100ML; MG/100ML
INJECTION, SOLUTION INTRAVENOUS CONTINUOUS
Status: ACTIVE | OUTPATIENT
Start: 2025-05-24 | End: 2025-05-25

## 2025-05-24 RX ORDER — NALOXONE HCL 0.4 MG/ML
0.02 VIAL (ML) INJECTION
Status: DISCONTINUED | OUTPATIENT
Start: 2025-05-24 | End: 2025-05-28 | Stop reason: HOSPADM

## 2025-05-24 RX ORDER — PSYLLIUM HUSK 0.4 G
1 CAPSULE ORAL 2 TIMES DAILY
Status: DISCONTINUED | OUTPATIENT
Start: 2025-05-24 | End: 2025-05-28 | Stop reason: HOSPADM

## 2025-05-24 RX ORDER — ACETAMINOPHEN 325 MG/1
650 TABLET ORAL EVERY 6 HOURS PRN
Status: DISCONTINUED | OUTPATIENT
Start: 2025-05-24 | End: 2025-05-28 | Stop reason: HOSPADM

## 2025-05-24 RX ORDER — LIDOCAINE 50 MG/G
1 PATCH TOPICAL DAILY PRN
Status: DISCONTINUED | OUTPATIENT
Start: 2025-05-24 | End: 2025-05-28 | Stop reason: HOSPADM

## 2025-05-24 RX ORDER — ACETAMINOPHEN 160 MG/5ML
10 SOLUTION ORAL
Status: COMPLETED | OUTPATIENT
Start: 2025-05-24 | End: 2025-05-24

## 2025-05-24 RX ORDER — TRIPROLIDINE/PSEUDOEPHEDRINE 2.5MG-60MG
100 TABLET ORAL
Status: COMPLETED | OUTPATIENT
Start: 2025-05-24 | End: 2025-05-24

## 2025-05-24 RX ORDER — ONDANSETRON HYDROCHLORIDE 2 MG/ML
4 INJECTION, SOLUTION INTRAVENOUS EVERY 6 HOURS PRN
Status: DISCONTINUED | OUTPATIENT
Start: 2025-05-24 | End: 2025-05-28 | Stop reason: HOSPADM

## 2025-05-24 RX ORDER — ALUMINUM HYDROXIDE, MAGNESIUM HYDROXIDE, AND SIMETHICONE 1200; 120; 1200 MG/30ML; MG/30ML; MG/30ML
30 SUSPENSION ORAL 4 TIMES DAILY PRN
Status: DISCONTINUED | OUTPATIENT
Start: 2025-05-24 | End: 2025-05-28 | Stop reason: HOSPADM

## 2025-05-24 RX ADMIN — ACETAMINOPHEN 444.8 MG: 160 SUSPENSION ORAL at 12:05

## 2025-05-24 RX ADMIN — IBUPROFEN 100 MG: 100 SUSPENSION ORAL at 12:05

## 2025-05-24 RX ADMIN — SODIUM CHLORIDE, POTASSIUM CHLORIDE, SODIUM LACTATE AND CALCIUM CHLORIDE: 600; 310; 30; 20 INJECTION, SOLUTION INTRAVENOUS at 09:05

## 2025-05-24 NOTE — H&P
[facility] [place] - Emergency Dept  Hospital Medicine  History & Physical    Patient Name: Morena Mcclendon  MRN: [id number]  Patient Class: IP- Inpatient  Admission Date: 5/24/2025  Attending Physician: Kamari Simpson MD   Primary Care Provider: Faith Nichole MD         Patient information was obtained from relative(s), past medical records, and ER records.     Subjective:     Principal Problem:Femoral neck fracture    Chief Complaint:   Chief Complaint   Patient presents with    Leg Pain     Pt had an assisted fall yesterday and this morning is having trouble standing. Pt son states she normally can walk but her left side appears hurt.         HPI: 89-year-old female presented to ED for eval s/p fall. pMHx dementia, anemia, colon cancer, cataract, CKD.  Patient is a poor historian as she has dementia, history obtained per ED report, chart review, and son who is at bedside.  It is reported patient fell yesterday evening while walking with home health staff when her legs gave out underneath her, patient was assisted to the ground by staff.  This morning, patient was unable to stand and bear weight to LLE 2/2 pain.  Patient's son states at baseline patient has shuffling gait with small steps and ambulates with assistance.  In ED today, patient found to have left hip fracture.  CT pelvis impression with Acute subcapital left femoral neck fracture with left hip lipohemarthrosis.  L knee XR and L tib/fib XR impressions without acute abnormality.  ED discussed case with ortho, plan is for surgery in a.m. Denies blood thinner use.  Basic labs pending.  Admit to hospital medicine for further eval.      Past Medical History:   Diagnosis Date    Anemia     Cancer     colon, removed all but 12 in    Cataract     Dementia        Past Surgical History:   Procedure Laterality Date    APPENDECTOMY      COLON SURGERY      EYE SURGERY      HERNIA REPAIR      HYSTERECTOMY      tumor removed  2014       Review of patient's  allergies indicates:  No Known Allergies    No current facility-administered medications on file prior to encounter.     Current Outpatient Medications on File Prior to Encounter   Medication Sig    calcium carbonate-vit D3-min 600 mg calcium- 400 unit Tab Take 1 tablet by mouth once daily. for 365 doses    ibandronate (BONIVA) 150 mg tablet Take 1 tablet (150 mg total) by mouth every 30 days. (Patient taking differently: Take 150 mg by mouth every 30 days. 1st of month)    multivitamin with minerals tablet Take 1 tablet by mouth once daily.    [DISCONTINUED] cholestyramine (QUESTRAN) 4 gram packet Take 1 packet (4 g total) by mouth 3 (three) times daily with meals. (Patient not taking: Reported on 9/19/2023)    [DISCONTINUED] Lactobacillus rhamnosus GG (CULTURELLE) 10 billion cell capsule Take 1 capsule by mouth once daily.    [DISCONTINUED] mupirocin (BACTROBAN) 2 % ointment Apply topically 2 (two) times daily. Apply topically to affected area three times a day    [DISCONTINUED] UNABLE TO FIND cbd gummy     Family History       Problem Relation (Age of Onset)    Arthritis Mother    Depression Mother    Emphysema Father    Hearing loss Maternal Grandmother          Tobacco Use    Smoking status: Never    Smokeless tobacco: Never   Substance and Sexual Activity    Alcohol use: Yes     Alcohol/week: 1.0 standard drink of alcohol     Types: 1 Glasses of wine per week     Comment: daily    Drug use: No    Sexual activity: Not Currently     Review of Systems   Unable to perform ROS: Dementia   Musculoskeletal:  Positive for arthralgias.     Objective:     Vital Signs (Most Recent):  Temp: 98.4 °F (36.9 °C) (05/24/25 1107)  Pulse: 92 (05/24/25 1107)  Resp: 18 (05/24/25 1107)  BP: (!) 192/89 (05/24/25 1107)  SpO2: (!) 93 % (05/24/25 1107) Vital Signs (24h Range):  Temp:  [98.4 °F (36.9 °C)] 98.4 °F (36.9 °C)  Pulse:  [92] 92  Resp:  [18] 18  SpO2:  [93 %] 93 %  BP: (192)/(89) 192/89     Weight: 44.5 kg (98 lb)  Body mass  "index is 17.36 kg/m².     Physical Exam  Vitals reviewed.   Constitutional:       General: She is not in acute distress.  HENT:      Head: Normocephalic and atraumatic.      Mouth/Throat:      Mouth: Mucous membranes are moist.   Eyes:      Conjunctiva/sclera: Conjunctivae normal.   Cardiovascular:      Rate and Rhythm: Normal rate and regular rhythm.      Pulses:           Dorsalis pedis pulses are 2+ on the left side.   Pulmonary:      Effort: Pulmonary effort is normal. No respiratory distress.   Abdominal:      Palpations: Abdomen is soft.      Tenderness: There is no abdominal tenderness.   Musculoskeletal:      Cervical back: Normal range of motion.      Right lower leg: No edema.      Left lower leg: No edema.      Comments: ROM limited LLE    Skin:     General: Skin is warm and dry.      Coloration: Skin is pale.   Neurological:      Mental Status: Mental status is at baseline.   Psychiatric:         Mood and Affect: Mood normal.                Significant Labs: All pertinent labs within the past 24 hours have been reviewed.  CBC: No results for input(s): "WBC", "HGB", "HCT", "PLT" in the last 48 hours.  CMP: No results for input(s): "NA", "K", "CL", "CO2", "GLU", "BUN", "CREATININE", "CALCIUM", "PROT", "ALBUMIN", "BILITOT", "ALKPHOS", "AST", "ALT", "ANIONGAP", "EGFRNONAA" in the last 48 hours.    Invalid input(s): "ESTGFAFRICA"    Significant Imaging: I have reviewed all pertinent imaging results/findings within the past 24 hours.  Assessment/Plan:     Assessment & Plan  Femoral neck fracture  Monitor daily CBC  Pain control  Neurovascular checks  PT/OT  Fall precautions  Consult ortho  Stage 3b chronic kidney disease  Creatine stable for now. CMP reviewed- noted Estimated Creatinine Clearance: 16.1 mL/min (A) (based on SCr of 1.6 mg/dL (H)). according to latest data. Based on current GFR, CKD stage is stage 3 - GFR 30-59.  Monitor UOP and serial CMP and adjust therapy as needed. Renally dose meds. Avoid " nephrotoxic medications and procedures.  Dementia  Patient with dementia with likely etiology of unknown dementia.  The patient does not have signs of behavioral disturbance. Continue non-pharmacologic interventions to prevent delirium (Activity during day, opening blinds, providing glasses/hearing aids, and up in chair during daytime). Will avoid narcotics and benzos unless absolutely necessary.   Anemia  Anemia etiology work up in progress, in with femur fracture. Most recent hemoglobin and hematocrit are listed below.  Recent Labs     05/24/25  1833   HGB 7.8*   HCT 25.8*     Plan  - Monitor serial CBC: Daily  - Transfuse PRBC if patient becomes hemodynamically unstable, symptomatic or H/H drops below 7/21.  - Patient has not received any PRBC transfusions this admission  - Patient's anemia is currently stable  -Type and screen, retic count, iron studies, coags  -Occult stool  - Repeat h&h at 0000  -Measure thigh circumference  -Protonix    VTE Risk Mitigation (From admission, onward)           Ordered     Reason for No Pharmacological VTE Prophylaxis  Once        Question:  Reasons:  Answer:  Risk of Bleeding    05/24/25 1645     IP VTE HIGH RISK PATIENT  Once         05/24/25 1645     Place sequential compression device  Until discontinued         05/24/25 1645                                    Allyn Love NP  Department of Hospital Medicine  [facility] - Emergency Dept

## 2025-05-24 NOTE — ASSESSMENT & PLAN NOTE
Creatine stable for now. CMP reviewed- noted Estimated Creatinine Clearance: 16.1 mL/min (A) (based on SCr of 1.6 mg/dL (H)). according to latest data. Based on current GFR, CKD stage is stage 3 - GFR 30-59.  Monitor UOP and serial CMP and adjust therapy as needed. Renally dose meds. Avoid nephrotoxic medications and procedures.

## 2025-05-24 NOTE — HPI
89-year-old female presented to ED for eval s/p fall. pMHx dementia, anemia, colon cancer, cataract, CKD.  Patient is a poor historian as she has dementia, history obtained per ED report, chart review, and son who is at bedside.  It is reported patient fell yesterday evening while walking with home health staff when her legs gave out underneath her, patient was assisted to the ground by staff.  This morning, patient was unable to stand and bear weight to LLE 2/2 pain.  Patient's son states at baseline patient has shuffling gait with small steps and ambulates with assistance.  In ED today, patient found to have left hip fracture.  CT pelvis impression with Acute subcapital left femoral neck fracture with left hip lipohemarthrosis.  L knee XR and L tib/fib XR impressions without acute abnormality.  ED discussed case with ortho, plan is for surgery in a.m. Denies blood thinner use.  Basic labs pending.  Admit to hospital medicine for further eval.

## 2025-05-24 NOTE — ED PROVIDER NOTES
Encounter Date: 5/24/2025       History     Chief Complaint   Patient presents with    Leg Pain     Pt had an assisted fall yesterday and this morning is having trouble standing. Pt son states she normally can walk but her left side appears hurt.      HPI    Morena Mcclendon 89-year-old female with past medical history of anemia, dementia, cancer (colon cancer) who presents to the emergency department for evaluation of left leg pain.  Patient family at bedside reports that yesterday (5/23) around 7:00 p.m. while walking with the home health aide the patient legs gave out and she slid to the ground landing on the caretakers foot.  States shortly after that the patient has been unable to ambulate normally, and complaining of pain to the left leg.  States they had to wheel the patient to bed last night in a rolling chair.  States this morning the patient is normally able to stand and ambulate while holding an arm but she was not able to do so in the setting of pain therefore wanted her to be evaluated.  Patient is awake alert and oriented with a GCS of 14 which appears to be baseline.  Patient states while sitting still she has no pain however with palpation and movement of the entire left leg down to the ankle she has pain.  Patient denies any symptoms prior to the fall to include nausea, vomiting, chest pain, shortness for breath, diaphoresis, palpitations, weakness, or feelings that she was going to pass out.    Review of patient's allergies indicates:  No Known Allergies  Past Medical History:   Diagnosis Date    Anemia     Cancer     colon, removed all but 12 in    Cataract     Dementia      Past Surgical History:   Procedure Laterality Date    APPENDECTOMY      COLON SURGERY      EYE SURGERY      HERNIA REPAIR      HYSTERECTOMY      tumor removed  2014     Family History   Problem Relation Name Age of Onset    Arthritis Mother      Depression Mother      Emphysema Father      Hearing loss Maternal Grandmother        Social History[1]  Review of Systems   Constitutional:  Positive for activity change (pain with walking). Negative for diaphoresis and fever.   HENT:  Negative for congestion.    Eyes:  Negative for photophobia and pain.   Respiratory:  Negative for chest tightness, shortness of breath and wheezing.    Cardiovascular:  Negative for chest pain.   Gastrointestinal:  Negative for diarrhea, nausea and vomiting.   Genitourinary:  Negative for dysuria and urgency.   Musculoskeletal:  Positive for gait problem (in the setting of pain). Negative for back pain.   Neurological:  Negative for syncope and light-headedness.       Physical Exam     Initial Vitals [05/24/25 1107]   BP Pulse Resp Temp SpO2   (!) 192/89 92 18 98.4 °F (36.9 °C) (!) 93 %      MAP       --         Physical Exam    Nursing note and vitals reviewed.  Constitutional: She appears well-developed and well-nourished.   HENT:   Head: Normocephalic.   Right Ear: External ear normal.   Left Ear: External ear normal. Mouth/Throat: Oropharynx is clear and moist.   Eyes: EOM are normal. Pupils are equal, round, and reactive to light. Right eye exhibits no discharge. Left eye exhibits no discharge.   Neck: No JVD present.   Normal range of motion.  Cardiovascular:  Normal rate and regular rhythm.           No murmur heard.  Pulmonary/Chest: Breath sounds normal. No respiratory distress. She has no wheezes. She has no rhonchi. She has no rales.   Abdominal: Abdomen is soft. She exhibits no distension. There is no abdominal tenderness. There is no rebound and no guarding.   Musculoskeletal:         General: Tenderness (Left leg) present. Normal range of motion.      Cervical back: Normal range of motion.     Neurological: She is alert. She displays normal reflexes. No cranial nerve deficit or sensory deficit.   Skin: Skin is warm. Capillary refill takes less than 2 seconds.         ED Course   Procedures  Labs Reviewed   COMPREHENSIVE METABOLIC PANEL - Abnormal        Result Value    Sodium 139      Potassium 4.7      Chloride 113 (*)     CO2 19 (*)     Glucose 113 (*)     BUN 31 (*)     Creatinine 1.6 (*)     Calcium 8.3 (*)     Protein Total 6.4      Albumin 3.4 (*)     Bilirubin Total 0.4            AST 11      ALT 10      Anion Gap 7 (*)     eGFR 30 (*)    CBC WITH DIFFERENTIAL - Abnormal    WBC 6.53      RBC 2.84 (*)     Hgb 7.8 (*)     Hct 25.8 (*)     MCV 91      MCH 27.5      MCHC 30.2 (*)     RDW 15.6 (*)     Platelet Count 180      MPV 11.2      Nucleated RBC 0      Neut % 80.3 (*)     Lymph % 7.7 (*)     Mono % 10.6      Eos % 0.5      Basophil % 0.3      Imm Grans % 0.6 (*)     Neut # 5.3      Lymph # 0.50 (*)     Mono # 0.69      Eos # 0.03      Baso # 0.02      Imm Grans # 0.04     B-TYPE NATRIURETIC PEPTIDE - Abnormal     (*)    MAGNESIUM - Normal    Magnesium 2.0     CBC W/ AUTO DIFFERENTIAL    Narrative:     The following orders were created for panel order CBC auto differential.  Procedure                               Abnormality         Status                     ---------                               -----------         ------                     CBC with Differential[[id number]]       Abnormal            Final result                 Please view results for these tests on the individual orders.   URINALYSIS, REFLEX TO URINE CULTURE          Imaging Results              CT Pelvis Without Contrast (Final result)  Result time 05/24/25 15:03:43   Procedure changed from CT Hip Without Contrast Left     Final result by Yossi Cain MD (05/24/25 15:03:43)                   Impression:      Acute subcapital left femoral neck fracture with left hip lipohemarthrosis.      Electronically signed by: Yossi Cain  Date:    05/24/2025  Time:    15:03               Narrative:    EXAMINATION:  CT PELVIS WITHOUT CONTRAST    CLINICAL HISTORY:  Fracture, hip;Attention left hip/ femoral neck;    TECHNIQUE:  Pelvis CT without IV  contrast    COMPARISON:  Radiographs 05/24/2025    FINDINGS:  Acute subcapital left femoral neck fracture is slightly impacted with apex anterior angulation suggested.    No additional fracture and no dislocation.  Moderate narrowing of left hip joint space is present.  Left hip lipohemarthrosis is evident.  Bones are diffusely demineralized.    Right hip and knee arthroplasty creates beam hardening artifact, slightly limiting evaluation.  Bones are diffusely demineralized.    Colonic diverticula are present.  Uterus has been removed.  Intestinal anastomosis partially visualized.    3 mm anterolisthesis of L4 on L5 are present.  Moderate L4-L5 disc degeneration is noted along with facet joint osteoarthrosis at L4-L5.  Left sacrum bone island incidentally noted.                                       X-Ray Tibia Fibula 2 View Left (Final result)  Result time 05/24/25 13:22:01      Final result by Yossi Cain MD (05/24/25 13:22:01)                   Impression:      No acute abnormality of left tibia or fibula.      Electronically signed by: Yossi Cain  Date:    05/24/2025  Time:    13:22               Narrative:    EXAMINATION:  XR TIBIA FIBULA 2 VIEW LEFT    CLINICAL HISTORY:  Unspecified fall, initial encounter    FINDINGS:  Two views of left tibia and fibula show no fracture, dislocation, or destructive osseous lesion. Soft tissues are unremarkable. Bones are diffusely demineralized, slightly limiting evaluation.  Chondrocalcinosis noted in mediolateral left knee compartments.                                       X-Ray Knee 3 View Left (Final result)  Result time 05/24/25 13:21:35   Procedure changed from X-Ray Knee Complete 4 or more Views Left     Final result by Yossi Cain MD (05/24/25 13:21:35)                   Impression:      No acute left knee abnormality.      Electronically signed by: Yossi Cain  Date:    05/24/2025  Time:    13:21               Narrative:    EXAMINATION:  XR KNEE 3 VIEW  LEFT    CLINICAL HISTORY:  Fall with left leg pain; Unspecified fall, initial encounter    FINDINGS:  Three views of left knee show no fracture, dislocation, or destructive osseous lesion. Chondrocalcinosis occurs in the medial and lateral compartments.  Negative for joint effusion.  Bones are diffusely demineralized, limiting evaluation.  Mild arterial vascular calcifications are present.                                       X-Ray Femur AP/LAT Left (Final result)  Result time 05/24/25 13:21:44      Final result by Yossi Cain MD (05/24/25 13:21:44)                   Impression:      Findings of high suspicion for acute left femoral neck fracture.      Electronically signed by: Yossi Cain  Date:    05/24/2025  Time:    13:21               Narrative:    EXAMINATION:  XR FEMUR 2 VIEW LEFT    CLINICAL HISTORY:  Unspecified fall, initial encounter    FINDINGS:  Two views of left femur again show cortical angulation and lucency coursing through left femoral neck suspicious for nondisplaced acute fracture.  Bones are diffusely demineralized.  No dislocation.  Arterial vascular calcifications are mild.                                        X-Ray Hip 2 or 3 views Left with Pelvis when performed (Final result)  Result time 05/24/25 13:21:49      Final result by Yossi Cain MD (05/24/25 13:21:49)                   Impression:      Findings of high suspicion for acute nondisplaced left femoral neck fracture.    This report was flagged in Epic as abnormal.      Electronically signed by: Yossi Cain  Date:    05/24/2025  Time:    13:21               Narrative:    EXAMINATION:  XR HIP WITH PELVIS WHEN PERFORMED 2 OR 3 VIEWS LEFT    CLINICAL HISTORY:  Unspecified fall, initial encounter    FINDINGS:  AP pelvis and two views of left hip compared with 02/23/2023 show new abnormal angulation along superolateral left femoral head neck junction with some lucency coursing through the left femoral neck, suspicious for  nondisplaced acute left femoral neck fracture.  Bones are diffusely demineralized.  Mild to moderate left hip joint space narrowing is suggested.    Left sacral ala bone island unchanged.  Right hip hemiarthroplasty remains in place.  No dislocation.  Soft tissues are unremarkable with surgical clips in left lower abdomen.                                       Medications   sodium chloride 0.9% flush 10 mL (has no administration in time range)   melatonin tablet 6 mg (has no administration in time range)   senna-docusate 8.6-50 mg per tablet 1 tablet (has no administration in time range)   aluminum-magnesium hydroxide-simethicone 200-200-20 mg/5 mL suspension 30 mL (has no administration in time range)   naloxone 0.4 mg/mL injection 0.02 mg (has no administration in time range)   LIDOcaine 5 % patch 1 patch (has no administration in time range)   multivitamin tablet (1 tablet Oral Not Given 5/25/25 0810)   calcium-vitamin D3 500 mg-5 mcg (200 unit) per tablet 1 tablet (1 tablet Oral Not Given 5/25/25 0900)   acetaminophen tablet 650 mg (has no administration in time range)   ondansetron injection 4 mg (has no administration in time range)   HYDROcodone-acetaminophen 5-325 mg per tablet 1 tablet (has no administration in time range)   lactated ringers infusion ( Intravenous Verify Only 5/25/25 0636)   pantoprazole injection 40 mg (40 mg Intravenous Given 5/25/25 1231)   0.9% NaCl infusion ( Intravenous New Bag 5/25/25 1846)   ibuprofen 20 mg/mL oral liquid 100 mg (100 mg Oral Given 5/24/25 1256)   acetaminophen 32 mg/mL liquid (PEDS) 444.8 mg (444.8 mg Oral Given 5/24/25 1255)     Medical Decision Making  Amount and/or Complexity of Data Reviewed  Radiology: ordered. Decision-making details documented in ED Course.    Risk  OTC drugs.  Decision regarding hospitalization.      Patient is a 89 year old female who presented with left leg pain after a ground level fall the day prior to evaluation with likely fracture.  Patient reports there was no loss of conscious just pain after she slid down the leg. Reports her feet became tangled and there was no chest pain, shortness of breath, nausea, vomiting, diaphoresis, palpitations, or feelings of syncope.  X ray shows likely fracture of the left proximal femur. CT scan was ordered and confirmed fracture. Spoke with orthopedics who came and evaluated the patient and asked for admission in the setting of future repair of the hip. Spoke with medicine who agreed to admission.              ED Course as of 05/25/25 1930   Sat May 24, 2025   1329 X-Ray Hip 2 or 3 views Left with Pelvis when performed(!)  Findings of high suspicion for acute nondisplaced left femoral neck fracture. [CH]   1335 Reaching out to Orthopedics to discuss fracture and any recommendations for follow up or extra imaging includes CT of the hip. [CH]   1410 Spoke with orthopedic surgery we will order CT of the left hip further evaluate. [CH]   1505 Spoke with orthopedic surgery is going to speak with the patient of the fracture and next steps. [CH]      ED Course User Index  [CH] Lopez Solorzano MD                           Clinical Impression:  Final diagnoses:  [W19.XXXA] Fall  [S72.009A] Femoral neck fracture          ED Disposition Condition    Admit                       [1]   Social History  Tobacco Use    Smoking status: Never    Smokeless tobacco: Never   Substance Use Topics    Alcohol use: Yes     Alcohol/week: 1.0 standard drink of alcohol     Types: 1 Glasses of wine per week     Comment: daily    Drug use: No        Lopez Solorzano MD  05/25/25 1931

## 2025-05-24 NOTE — ASSESSMENT & PLAN NOTE
Patient with dementia with likely etiology of unknown dementia.  The patient does not have signs of behavioral disturbance. Continue non-pharmacologic interventions to prevent delirium (Activity during day, opening blinds, providing glasses/hearing aids, and up in chair during daytime). Will avoid narcotics and benzos unless absolutely necessary.

## 2025-05-24 NOTE — CONSULTS
"  [facility] Emergency Dept  History & Physical  Orthopedics    SUBJECTIVE:     Chief Complaint/Reason for Admission:  Left hip femoral neck fracture    History of Present Illness:    Patient is a 89 y.o. female who presents with history of a fall at a skilled nursing home resulting in inability to walk on the left. This occurred today.    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    Ulcer of left foot, limited to breakdown of skin 03/21/2023    Pure hypercholesterolemia 03/08/2021       Prescriptions Prior to Admission[1]    Review of patient's allergies indicates:  No Known Allergies    Past Medical History:   Diagnosis Date    Anemia     Cancer     colon, removed all but 12 in    Cataract     Dementia      Past Surgical History:   Procedure Laterality Date    APPENDECTOMY      COLON SURGERY      EYE SURGERY      HERNIA REPAIR      HYSTERECTOMY      tumor removed  2014     Social History     Tobacco Use    Smoking status: Never    Smokeless tobacco: Never   Substance Use Topics    Alcohol use: Yes     Alcohol/week: 1.0 standard drink of alcohol     Types: 1 Glasses of wine per week     Comment: daily        Review of Systems:  Pertinent items are noted in HPI.    OBJECTIVE:     Vital signs in last 24 hours:  Temp:  [98.4 °F (36.9 °C)] 98.4 °F (36.9 °C)  Pulse:  [92] 92  Resp:  [18] 18  SpO2:  [93 %] 93 %  BP: (192)/(89) 192/89      BP (!) 192/89   Pulse 92   Temp 98.4 °F (36.9 °C) (Oral)   Resp 18   Ht 5' 3" (1.6 m)   Wt 44.5 kg (98 lb)   SpO2 (!) 93%   Breastfeeding No   BMI 17.36 kg/m²     General Appearance:    Alert, cooperative, no distress, appears stated age   Head:    Normocephalic, without obvious abnormality, atraumatic   Eyes:    PERRL, conjunctiva/corneas clear, EOM's intact, fundi     benign, both eyes   Ears:  "   Normal TM's and external ear canals, both ears   Nose:   Nares normal, septum midline, mucosa normal, no drainage    or sinus tenderness   Throat:   Lips, mucosa, and tongue normal; teeth and gums normal   Neck:   Supple, symmetrical, trachea midline, no adenopathy;     thyroid:  no enlargement/tenderness/nodules; no carotid    bruit or JVD   Back:     Symmetric, no curvature, ROM normal, no CVA tenderness   Lungs:     Clear to auscultation bilaterally, respirations unlabored   Chest Wall:    No tenderness or deformity    Heart:    Regular rate and rhythm, S1 and S2 normal, no murmur, rub   or gallop   Breast Exam:    No tenderness, masses, or nipple abnormality   Abdomen:     Soft, non-tender, bowel sounds active all four quadrants,     no masses, no organomegaly   Genitalia:    Normal female without lesion, discharge or tenderness   Rectal:    Normal tone, no masses or tenderness;    guaiac negative stool   Extremities:   Extremities normal, atraumatic, no cyanosis or edema   Pulses:   2+ and symmetric all extremities   Skin:   Skin color, texture, turgor normal, no rashes or lesions   Lymph nodes:   Cervical, supraclavicular, and axillary nodes normal   Neurologic:   CNII-XII intact, normal strength, sensation and reflexes     throughout       Imaging Review:  CT scan of the left hip was done today and shows a displaced femoral neck fracture with severe arthritis of the left hip    ASSESSMENT/PLAN:     left femoral neck hip fracture    Will proceed with operative fixation.  Plan for surgery tomorrow   NPO at midnight   Hold DVT prophylaxis   Pain control overnight    Please text or call with any questions  Adan Matos MD  Orthopedic Surgeon  [facility]  [phone]    Medical consultation for post-operative medical care.  I communicated with the PCP that the patient may be at risk for osteoporosis given the nature of the fracture: yes       [1] (Not in a hospital admission)

## 2025-05-24 NOTE — SUBJECTIVE & OBJECTIVE
Past Medical History:   Diagnosis Date    Anemia     Cancer     colon, removed all but 12 in    Cataract     Dementia        Past Surgical History:   Procedure Laterality Date    APPENDECTOMY      COLON SURGERY      EYE SURGERY      HERNIA REPAIR      HYSTERECTOMY      tumor removed  2014       Review of patient's allergies indicates:  No Known Allergies    No current facility-administered medications on file prior to encounter.     Current Outpatient Medications on File Prior to Encounter   Medication Sig    calcium carbonate-vit D3-min 600 mg calcium- 400 unit Tab Take 1 tablet by mouth once daily. for 365 doses    ibandronate (BONIVA) 150 mg tablet Take 1 tablet (150 mg total) by mouth every 30 days. (Patient taking differently: Take 150 mg by mouth every 30 days. 1st of month)    multivitamin with minerals tablet Take 1 tablet by mouth once daily.    [DISCONTINUED] cholestyramine (QUESTRAN) 4 gram packet Take 1 packet (4 g total) by mouth 3 (three) times daily with meals. (Patient not taking: Reported on 9/19/2023)    [DISCONTINUED] Lactobacillus rhamnosus GG (CULTURELLE) 10 billion cell capsule Take 1 capsule by mouth once daily.    [DISCONTINUED] mupirocin (BACTROBAN) 2 % ointment Apply topically 2 (two) times daily. Apply topically to affected area three times a day    [DISCONTINUED] UNABLE TO FIND cbd gummy     Family History       Problem Relation (Age of Onset)    Arthritis Mother    Depression Mother    Emphysema Father    Hearing loss Maternal Grandmother          Tobacco Use    Smoking status: Never    Smokeless tobacco: Never   Substance and Sexual Activity    Alcohol use: Yes     Alcohol/week: 1.0 standard drink of alcohol     Types: 1 Glasses of wine per week     Comment: daily    Drug use: No    Sexual activity: Not Currently     Review of Systems   Unable to perform ROS: Dementia   Musculoskeletal:  Positive for arthralgias.     Objective:     Vital Signs (Most Recent):  Temp: 98.4 °F (36.9 °C)  "(05/24/25 1107)  Pulse: 92 (05/24/25 1107)  Resp: 18 (05/24/25 1107)  BP: (!) 192/89 (05/24/25 1107)  SpO2: (!) 93 % (05/24/25 1107) Vital Signs (24h Range):  Temp:  [98.4 °F (36.9 °C)] 98.4 °F (36.9 °C)  Pulse:  [92] 92  Resp:  [18] 18  SpO2:  [93 %] 93 %  BP: (192)/(89) 192/89     Weight: 44.5 kg (98 lb)  Body mass index is 17.36 kg/m².     Physical Exam  Vitals reviewed.   Constitutional:       General: She is not in acute distress.  HENT:      Head: Normocephalic and atraumatic.      Mouth/Throat:      Mouth: Mucous membranes are moist.   Eyes:      Conjunctiva/sclera: Conjunctivae normal.   Cardiovascular:      Rate and Rhythm: Normal rate and regular rhythm.      Pulses:           Dorsalis pedis pulses are 2+ on the left side.   Pulmonary:      Effort: Pulmonary effort is normal. No respiratory distress.   Abdominal:      Palpations: Abdomen is soft.      Tenderness: There is no abdominal tenderness.   Musculoskeletal:      Cervical back: Normal range of motion.      Right lower leg: No edema.      Left lower leg: No edema.      Comments: ROM limited LLE    Skin:     General: Skin is warm and dry.      Coloration: Skin is pale.   Neurological:      Mental Status: Mental status is at baseline.   Psychiatric:         Mood and Affect: Mood normal.                Significant Labs: All pertinent labs within the past 24 hours have been reviewed.  CBC: No results for input(s): "WBC", "HGB", "HCT", "PLT" in the last 48 hours.  CMP: No results for input(s): "NA", "K", "CL", "CO2", "GLU", "BUN", "CREATININE", "CALCIUM", "PROT", "ALBUMIN", "BILITOT", "ALKPHOS", "AST", "ALT", "ANIONGAP", "EGFRNONAA" in the last 48 hours.    Invalid input(s): "ESTGFAFRICA"    Significant Imaging: I have reviewed all pertinent imaging results/findings within the past 24 hours.  "

## 2025-05-24 NOTE — Clinical Note
Diagnosis: Femoral neck fracture [195717]   Future Attending Provider: LOUIE ROMERO [[id number]]   [facility] [[id number]]

## 2025-05-25 ENCOUNTER — ANESTHESIA EVENT (OUTPATIENT)
Dept: SURGERY | Facility: HOSPITAL | Age: OVER 89
DRG: 522 | End: 2025-05-25
Payer: MEDICARE

## 2025-05-25 ENCOUNTER — ANESTHESIA (OUTPATIENT)
Dept: SURGERY | Facility: HOSPITAL | Age: OVER 89
DRG: 522 | End: 2025-05-25
Payer: MEDICARE

## 2025-05-25 LAB
ABSOLUTE EOSINOPHIL (SMH): 0 K/UL
ABSOLUTE MONOCYTE (SMH): 0.46 K/UL (ref 0.3–1)
ABSOLUTE NEUTROPHIL COUNT (SMH): 8 K/UL (ref 1.8–7.7)
ALBUMIN SERPL-MCNC: 3.3 G/DL (ref 3.5–5.2)
ALP SERPL-CCNC: 101 UNIT/L (ref 55–135)
ALT SERPL-CCNC: 9 UNIT/L (ref 10–44)
ANION GAP (SMH): 8 MMOL/L (ref 8–16)
AST SERPL-CCNC: 12 UNIT/L (ref 10–40)
BASOPHILS # BLD AUTO: 0.01 K/UL
BASOPHILS NFR BLD AUTO: 0.1 %
BILIRUB SERPL-MCNC: 0.3 MG/DL (ref 0.1–1)
BUN SERPL-MCNC: 29 MG/DL (ref 10–30)
CALCIUM SERPL-MCNC: 8.1 MG/DL (ref 8.7–10.5)
CHLORIDE SERPL-SCNC: 110 MMOL/L (ref 95–110)
CO2 SERPL-SCNC: 18 MMOL/L (ref 23–29)
CREAT SERPL-MCNC: 1.7 MG/DL (ref 0.5–1.4)
ERYTHROCYTE [DISTWIDTH] IN BLOOD BY AUTOMATED COUNT: 15.8 % (ref 11.5–14.5)
GFR SERPLBLD CREATININE-BSD FMLA CKD-EPI: 28 ML/MIN/1.73/M2
GLUCOSE SERPL-MCNC: 179 MG/DL (ref 70–110)
HCT VFR BLD AUTO: 24.2 % (ref 37–48.5)
HCT VFR BLD AUTO: 27.1 % (ref 37–48.5)
HGB BLD-MCNC: 7.4 GM/DL (ref 12–16)
HGB BLD-MCNC: 8.1 GM/DL (ref 12–16)
IMM GRANULOCYTES # BLD AUTO: 0.06 K/UL (ref 0–0.04)
IMM GRANULOCYTES NFR BLD AUTO: 0.7 % (ref 0–0.5)
LYMPHOCYTES # BLD AUTO: 0.22 K/UL (ref 1–4.8)
MAGNESIUM SERPL-MCNC: 1.7 MG/DL (ref 1.6–2.6)
MCH RBC QN AUTO: 27.9 PG (ref 27–31)
MCHC RBC AUTO-ENTMCNC: 30.6 G/DL (ref 32–36)
MCV RBC AUTO: 91 FL (ref 82–98)
NUCLEATED RBC (/100WBC) (SMH): 0 /100 WBC
OB PNL STL: NEGATIVE
PLATELET # BLD AUTO: 178 K/UL (ref 150–450)
PMV BLD AUTO: 11.8 FL (ref 9.2–12.9)
POTASSIUM SERPL-SCNC: 4.9 MMOL/L (ref 3.5–5.1)
PROT SERPL-MCNC: 6.3 GM/DL (ref 6–8.4)
RBC # BLD AUTO: 2.65 M/UL (ref 4–5.4)
RELATIVE EOSINOPHIL (SMH): 0 % (ref 0–8)
RELATIVE LYMPHOCYTE (SMH): 2.5 % (ref 18–48)
RELATIVE MONOCYTE (SMH): 5.3 % (ref 4–15)
RELATIVE NEUTROPHIL (SMH): 91.4 % (ref 38–73)
SODIUM SERPL-SCNC: 136 MMOL/L (ref 136–145)
WBC # BLD AUTO: 8.75 K/UL (ref 3.9–12.7)

## 2025-05-25 PROCEDURE — 27201423 OPTIME MED/SURG SUP & DEVICES STERILE SUPPLY: Performed by: STUDENT IN AN ORGANIZED HEALTH CARE EDUCATION/TRAINING PROGRAM

## 2025-05-25 PROCEDURE — 85025 COMPLETE CBC W/AUTO DIFF WBC: CPT

## 2025-05-25 PROCEDURE — 36000711: Performed by: STUDENT IN AN ORGANIZED HEALTH CARE EDUCATION/TRAINING PROGRAM

## 2025-05-25 PROCEDURE — 36415 COLL VENOUS BLD VENIPUNCTURE: CPT | Performed by: STUDENT IN AN ORGANIZED HEALTH CARE EDUCATION/TRAINING PROGRAM

## 2025-05-25 PROCEDURE — 0SRB02Z REPLACEMENT OF LEFT HIP JOINT WITH METAL ON POLYETHYLENE SYNTHETIC SUBSTITUTE, OPEN APPROACH: ICD-10-PCS | Performed by: STUDENT IN AN ORGANIZED HEALTH CARE EDUCATION/TRAINING PROGRAM

## 2025-05-25 PROCEDURE — 83735 ASSAY OF MAGNESIUM: CPT | Performed by: STUDENT IN AN ORGANIZED HEALTH CARE EDUCATION/TRAINING PROGRAM

## 2025-05-25 PROCEDURE — 36000710: Performed by: STUDENT IN AN ORGANIZED HEALTH CARE EDUCATION/TRAINING PROGRAM

## 2025-05-25 PROCEDURE — 99900035 HC TECH TIME PER 15 MIN (STAT)

## 2025-05-25 PROCEDURE — 93010 ELECTROCARDIOGRAM REPORT: CPT | Mod: ,,, | Performed by: GENERAL PRACTICE

## 2025-05-25 PROCEDURE — 37000009 HC ANESTHESIA EA ADD 15 MINS: Performed by: STUDENT IN AN ORGANIZED HEALTH CARE EDUCATION/TRAINING PROGRAM

## 2025-05-25 PROCEDURE — 93005 ELECTROCARDIOGRAM TRACING: CPT | Performed by: GENERAL PRACTICE

## 2025-05-25 PROCEDURE — 71000039 HC RECOVERY, EACH ADD'L HOUR: Performed by: STUDENT IN AN ORGANIZED HEALTH CARE EDUCATION/TRAINING PROGRAM

## 2025-05-25 PROCEDURE — 71000033 HC RECOVERY, INTIAL HOUR: Performed by: STUDENT IN AN ORGANIZED HEALTH CARE EDUCATION/TRAINING PROGRAM

## 2025-05-25 PROCEDURE — 37000008 HC ANESTHESIA 1ST 15 MINUTES: Performed by: STUDENT IN AN ORGANIZED HEALTH CARE EDUCATION/TRAINING PROGRAM

## 2025-05-25 PROCEDURE — 63600175 PHARM REV CODE 636 W HCPCS

## 2025-05-25 PROCEDURE — 80053 COMPREHEN METABOLIC PANEL: CPT | Performed by: STUDENT IN AN ORGANIZED HEALTH CARE EDUCATION/TRAINING PROGRAM

## 2025-05-25 PROCEDURE — 27000673 HC TUBING BLOOD Y: Performed by: ANESTHESIOLOGY

## 2025-05-25 PROCEDURE — 25000003 PHARM REV CODE 250

## 2025-05-25 PROCEDURE — 82272 OCCULT BLD FECES 1-3 TESTS: CPT

## 2025-05-25 PROCEDURE — 63600175 PHARM REV CODE 636 W HCPCS: Performed by: STUDENT IN AN ORGANIZED HEALTH CARE EDUCATION/TRAINING PROGRAM

## 2025-05-25 PROCEDURE — C1776 JOINT DEVICE (IMPLANTABLE): HCPCS | Performed by: STUDENT IN AN ORGANIZED HEALTH CARE EDUCATION/TRAINING PROGRAM

## 2025-05-25 PROCEDURE — 94761 N-INVAS EAR/PLS OXIMETRY MLT: CPT

## 2025-05-25 PROCEDURE — 27201107 HC STYLET, STANDARD: Performed by: ANESTHESIOLOGY

## 2025-05-25 PROCEDURE — 11000001 HC ACUTE MED/SURG PRIVATE ROOM

## 2025-05-25 PROCEDURE — C1713 ANCHOR/SCREW BN/BN,TIS/BN: HCPCS | Performed by: STUDENT IN AN ORGANIZED HEALTH CARE EDUCATION/TRAINING PROGRAM

## 2025-05-25 PROCEDURE — 94799 UNLISTED PULMONARY SVC/PX: CPT

## 2025-05-25 PROCEDURE — 25000003 PHARM REV CODE 250: Performed by: STUDENT IN AN ORGANIZED HEALTH CARE EDUCATION/TRAINING PROGRAM

## 2025-05-25 PROCEDURE — 99900031 HC PATIENT EDUCATION (STAT)

## 2025-05-25 DEVICE — IMPLANTABLE DEVICE: Type: IMPLANTABLE DEVICE | Site: HIP | Status: FUNCTIONAL

## 2025-05-25 DEVICE — HEAD BIOLOX FEM +3.5X28MM: Type: IMPLANTABLE DEVICE | Site: HIP | Status: FUNCTIONAL

## 2025-05-25 DEVICE — STEM AVENIR FEM STD HIP SIZE 2: Type: IMPLANTABLE DEVICE | Site: HIP | Status: FUNCTIONAL

## 2025-05-25 DEVICE — SCREW BONE 6.5X30 SELF-TAP: Type: IMPLANTABLE DEVICE | Site: HIP | Status: FUNCTIONAL

## 2025-05-25 RX ORDER — PROPOFOL 10 MG/ML
VIAL (ML) INTRAVENOUS CONTINUOUS PRN
Status: DISCONTINUED | OUTPATIENT
Start: 2025-05-25 | End: 2025-05-25

## 2025-05-25 RX ORDER — MUPIROCIN 20 MG/G
OINTMENT TOPICAL 2 TIMES DAILY
Status: DISCONTINUED | OUTPATIENT
Start: 2025-05-25 | End: 2025-05-28 | Stop reason: HOSPADM

## 2025-05-25 RX ORDER — FENTANYL CITRATE 50 UG/ML
INJECTION, SOLUTION INTRAMUSCULAR; INTRAVENOUS
Status: DISCONTINUED | OUTPATIENT
Start: 2025-05-25 | End: 2025-05-25

## 2025-05-25 RX ORDER — ROCURONIUM BROMIDE 10 MG/ML
INJECTION, SOLUTION INTRAVENOUS
Status: DISCONTINUED | OUTPATIENT
Start: 2025-05-25 | End: 2025-05-25

## 2025-05-25 RX ORDER — ACETAMINOPHEN 10 MG/ML
INJECTION, SOLUTION INTRAVENOUS
Status: DISCONTINUED | OUTPATIENT
Start: 2025-05-25 | End: 2025-05-25

## 2025-05-25 RX ORDER — SUCCINYLCHOLINE CHLORIDE 20 MG/ML
INJECTION INTRAMUSCULAR; INTRAVENOUS
Status: DISCONTINUED | OUTPATIENT
Start: 2025-05-25 | End: 2025-05-25

## 2025-05-25 RX ORDER — SODIUM CHLORIDE 9 MG/ML
INJECTION, SOLUTION INTRAVENOUS CONTINUOUS
Status: DISCONTINUED | OUTPATIENT
Start: 2025-05-25 | End: 2025-05-26

## 2025-05-25 RX ORDER — FAMOTIDINE 10 MG/ML
INJECTION, SOLUTION INTRAVENOUS
Status: DISCONTINUED | OUTPATIENT
Start: 2025-05-25 | End: 2025-05-25

## 2025-05-25 RX ORDER — HYDROCODONE BITARTRATE AND ACETAMINOPHEN 5; 325 MG/1; MG/1
1 TABLET ORAL EVERY 4 HOURS PRN
Status: DISCONTINUED | OUTPATIENT
Start: 2025-05-25 | End: 2025-05-28 | Stop reason: HOSPADM

## 2025-05-25 RX ORDER — SODIUM CHLORIDE 0.9 % (FLUSH) 0.9 %
10 SYRINGE (ML) INJECTION
Status: DISCONTINUED | OUTPATIENT
Start: 2025-05-25 | End: 2025-05-28 | Stop reason: HOSPADM

## 2025-05-25 RX ORDER — LIDOCAINE HYDROCHLORIDE 20 MG/ML
INJECTION INTRAVENOUS
Status: DISCONTINUED | OUTPATIENT
Start: 2025-05-25 | End: 2025-05-25

## 2025-05-25 RX ORDER — PROPOFOL 10 MG/ML
VIAL (ML) INTRAVENOUS
Status: DISCONTINUED | OUTPATIENT
Start: 2025-05-25 | End: 2025-05-25

## 2025-05-25 RX ORDER — ONDANSETRON HYDROCHLORIDE 2 MG/ML
INJECTION, SOLUTION INTRAVENOUS
Status: DISCONTINUED | OUTPATIENT
Start: 2025-05-25 | End: 2025-05-25

## 2025-05-25 RX ORDER — POLYETHYLENE GLYCOL 3350 17 G/17G
17 POWDER, FOR SOLUTION ORAL DAILY
Status: DISCONTINUED | OUTPATIENT
Start: 2025-05-26 | End: 2025-05-28 | Stop reason: HOSPADM

## 2025-05-25 RX ORDER — DEXAMETHASONE SODIUM PHOSPHATE 4 MG/ML
INJECTION, SOLUTION INTRA-ARTICULAR; INTRALESIONAL; INTRAMUSCULAR; INTRAVENOUS; SOFT TISSUE
Status: DISCONTINUED | OUTPATIENT
Start: 2025-05-25 | End: 2025-05-25

## 2025-05-25 RX ORDER — FENTANYL CITRATE 50 UG/ML
25 INJECTION, SOLUTION INTRAMUSCULAR; INTRAVENOUS EVERY 5 MIN PRN
Refills: 0 | OUTPATIENT
Start: 2025-05-25

## 2025-05-25 RX ORDER — SODIUM CHLORIDE, SODIUM LACTATE, POTASSIUM CHLORIDE, CALCIUM CHLORIDE 600; 310; 30; 20 MG/100ML; MG/100ML; MG/100ML; MG/100ML
INJECTION, SOLUTION INTRAVENOUS CONTINUOUS PRN
Status: DISCONTINUED | OUTPATIENT
Start: 2025-05-25 | End: 2025-05-25

## 2025-05-25 RX ORDER — GLUCAGON 1 MG
1 KIT INJECTION
OUTPATIENT
Start: 2025-05-25

## 2025-05-25 RX ORDER — ONDANSETRON HYDROCHLORIDE 2 MG/ML
4 INJECTION, SOLUTION INTRAVENOUS DAILY PRN
OUTPATIENT
Start: 2025-05-25

## 2025-05-25 RX ADMIN — FENTANYL CITRATE 25 MCG: 50 INJECTION, SOLUTION INTRAMUSCULAR; INTRAVENOUS at 09:05

## 2025-05-25 RX ADMIN — ONDANSETRON 4 MG: 2 INJECTION INTRAMUSCULAR; INTRAVENOUS at 08:05

## 2025-05-25 RX ADMIN — ROCURONIUM BROMIDE 5 MG: 10 INJECTION, SOLUTION INTRAVENOUS at 08:05

## 2025-05-25 RX ADMIN — DEXAMETHASONE SODIUM PHOSPHATE 4 MG: 4 INJECTION, SOLUTION INTRAMUSCULAR; INTRAVENOUS at 08:05

## 2025-05-25 RX ADMIN — Medication 100 MG: at 08:05

## 2025-05-25 RX ADMIN — SODIUM CHLORIDE: 9 INJECTION, SOLUTION INTRAVENOUS at 06:05

## 2025-05-25 RX ADMIN — PROPOFOL 60 MG: 10 INJECTION, EMULSION INTRAVENOUS at 08:05

## 2025-05-25 RX ADMIN — FAMOTIDINE 20 MG: 10 INJECTION, SOLUTION INTRAVENOUS at 08:05

## 2025-05-25 RX ADMIN — PROPOFOL 50 MCG/KG/MIN: 10 INJECTION, EMULSION INTRAVENOUS at 08:05

## 2025-05-25 RX ADMIN — PANTOPRAZOLE SODIUM 40 MG: 40 INJECTION, POWDER, FOR SOLUTION INTRAVENOUS at 12:05

## 2025-05-25 RX ADMIN — MUPIROCIN 1 G: 20 OINTMENT TOPICAL at 11:05

## 2025-05-25 RX ADMIN — ACETAMINOPHEN 700 MG: 10 INJECTION, SOLUTION INTRAVENOUS at 09:05

## 2025-05-25 RX ADMIN — SODIUM CHLORIDE, SODIUM LACTATE, POTASSIUM CHLORIDE, AND CALCIUM CHLORIDE: .6; .31; .03; .02 INJECTION, SOLUTION INTRAVENOUS at 08:05

## 2025-05-25 RX ADMIN — Medication 1 TABLET: at 08:05

## 2025-05-25 RX ADMIN — SUGAMMADEX 200 MG: 100 INJECTION, SOLUTION INTRAVENOUS at 09:05

## 2025-05-25 RX ADMIN — LIDOCAINE HYDROCHLORIDE 60 MG: 20 INJECTION, SOLUTION INTRAVENOUS at 08:05

## 2025-05-25 NOTE — ASSESSMENT & PLAN NOTE
Monitor daily CBC  Pain control  Neurovascular checks  PT/OT  Fall precautions  Consult ortho  Status post femur repair

## 2025-05-25 NOTE — ASSESSMENT & PLAN NOTE
Anemia etiology work up in progress, in with femur fracture. Most recent hemoglobin and hematocrit are listed below.  Recent Labs     05/24/25  1833   HGB 7.8*   HCT 25.8*     Plan  - Monitor serial CBC: Daily  - Transfuse PRBC if patient becomes hemodynamically unstable, symptomatic or H/H drops below 7/21.  - Patient has not received any PRBC transfusions this admission  - Patient's anemia is currently stable  -Type and screen, retic count, iron studies, coags  -Occult stool  - Repeat h&h at 0000  -Measure thigh circumference  -Protonix

## 2025-05-25 NOTE — PT/OT/SLP PROGRESS
Occupational Therapy      Patient Name:  Morena Mcclendon   MRN:  [id number]    Patient not seen today secondary to Nurse/ GIOVANY hold (pt having sx today). Will follow-up next service date.    5/25/2025

## 2025-05-25 NOTE — RESPIRATORY THERAPY
05/25/25 0801   Patient Assessment/Suction   Level of Consciousness (AVPU) alert   Respiratory Effort Normal;Unlabored   Expansion/Accessory Muscles/Retractions no retractions;no use of accessory muscles   All Lung Fields Breath Sounds Anterior:;Posterior:;Lateral:;equal bilaterally;clear   Rhythm/Pattern, Respiratory depth regular   Cough Frequency no cough  (asked patient to cough, she stated I do not have too.)   PRE-TX-O2   Device (Oxygen Therapy) room air   SpO2 98 %   Pulse Oximetry Type Intermittent   $ Pulse Oximetry - Multiple Charge Pulse Oximetry - Multiple   Pulse 65   Resp 16   Incentive Spirometer   $ Incentive Spirometer Charges unable to perform  (attempted to instruct, patient is confused and would not follow proper commands.  Patient family at bedside and assisted without success)   Incentive Spirometer Predicted Level (mL) 1000   Administration (IS) instruction provided, initial;unable to perform  (attempted to instruct, patient is confused and would not follow proper commands. Patient family at bedside and assisted without success)   Patient Tolerance (IS) no adverse signs/symptoms present;good

## 2025-05-25 NOTE — RESPIRATORY THERAPY
05/25/25 1252   Patient Assessment/Suction   Level of Consciousness (AVPU) alert   Respiratory Effort Normal;Unlabored   Expansion/Accessory Muscles/Retractions no retractions;no use of accessory muscles   All Lung Fields Breath Sounds Anterior:;Posterior:;Lateral:;equal bilaterally;clear   Rhythm/Pattern, Respiratory depth regular   Cough Frequency no cough   PRE-TX-O2   Device (Oxygen Therapy) room air   SpO2 96 %   Pulse Oximetry Type Intermittent   $ Pulse Oximetry - Multiple Charge Pulse Oximetry - Multiple   Pulse 92   Resp 16   Incentive Spirometer   $ Incentive Spirometer Charges unable to perform  (patient confused and unable to follow commands)   Incentive Spirometer Predicted Level (mL) 100   Administration (IS) unable to perform  (patient confused and unable to follow commands)   Patient Tolerance (IS) no adverse signs/symptoms present

## 2025-05-25 NOTE — ASSESSMENT & PLAN NOTE
Creatine stable for now. CMP reviewed- noted Estimated Creatinine Clearance: 15.4 mL/min (A) (based on SCr of 1.6 mg/dL (H)). according to latest data. Based on current GFR, CKD stage is stage 3 - GFR 30-59.  Monitor UOP and serial CMP and adjust therapy as needed. Renally dose meds. Avoid nephrotoxic medications and procedures.

## 2025-05-25 NOTE — SUBJECTIVE & OBJECTIVE
Interval History:   Patient seen and examined postoperatively.  Pain well-controlled.  NAD.  VSS.  PT/OT consulted.   Review of Systems   Unable to perform ROS: Dementia   Musculoskeletal:  Positive for arthralgias.     Objective:     Vital Signs (Most Recent):  Temp: 98.2 °F (36.8 °C) (05/25/25 1120)  Pulse: 92 (05/25/25 1252)  Resp: 16 (05/25/25 1252)  BP: 136/82 (05/25/25 1120)  SpO2: 96 % (05/25/25 1252) Vital Signs (24h Range):  Temp:  [97 °F (36.1 °C)-98.9 °F (37.2 °C)] 98.2 °F (36.8 °C)  Pulse:  [] 92  Resp:  [13-20] 16  SpO2:  [92 %-99 %] 96 %  BP: (120-167)/(55-87) 136/82     Weight: 42.6 kg (93 lb 14.7 oz)  Body mass index is 16.64 kg/m².    Intake/Output Summary (Last 24 hours) at 5/25/2025 1332  Last data filed at 5/25/2025 0948  Gross per 24 hour   Intake 1305.15 ml   Output --   Net 1305.15 ml         Physical Exam  Vitals reviewed.   Constitutional:       General: She is not in acute distress.  HENT:      Head: Normocephalic and atraumatic.      Mouth/Throat:      Mouth: Mucous membranes are moist.   Eyes:      Conjunctiva/sclera: Conjunctivae normal.   Cardiovascular:      Rate and Rhythm: Normal rate and regular rhythm.      Pulses:           Dorsalis pedis pulses are 2+ on the left side.   Pulmonary:      Effort: Pulmonary effort is normal. No respiratory distress.   Abdominal:      Palpations: Abdomen is soft.      Tenderness: There is no abdominal tenderness.   Musculoskeletal:      Cervical back: Normal range of motion.      Right lower leg: No edema.      Left lower leg: No edema.      Comments: ROM limited LLE    Skin:     General: Skin is warm and dry.      Coloration: Skin is pale.      Findings: Bruising present.   Neurological:      Mental Status: Mental status is at baseline.   Psychiatric:         Mood and Affect: Mood normal.               Significant Labs: All pertinent labs within the past 24 hours have been reviewed.  Recent Lab Results  (Last 5 results in the past 24 hours)         05/25/25  0631   05/24/25  2339   05/24/25  2120   05/24/25  2039   05/24/25  1833        ABO and RH     O POS           Albumin         3.4       ALP         117       ALT         10       Anion Gap         7       PTT     26.1  Comment: Refer to local heparin nomogram for intensity/dose specific therapeutic range.           AST         11       Baso #         0.02       Basophil %         0.3       BILIRUBIN TOTAL         0.4  Comment: For infants and newborns, interpretation of results should be based   on gestational age, weight and in agreement with clinical   observations.    Premature Infant recommended reference ranges:   0-24 hours:  <8.0 mg/dL   24-48 hours: <12.0 mg/dL   3-5 days:    <15.0 mg/dL   6-29 days:   <15.0 mg/dL       BNP         245  Comment: Values of less than 100 pg/ml are consistent with non-CHF populations.       BUN         31       Calcium         8.3       Chloride         113       CO2         19       Creatinine         1.6       eGFR         30       Eos #         0.03       Eos %         0.5       Ferritin     158.6           Glucose         113       Group & Rh       O POS         Hematocrit   27.1       25.8       Hemoglobin   8.1       7.8       Immature Grans (Abs)         0.04  Comment: Mild elevation in immature granulocytes is non specific and can be seen in a variety of conditions including stress response, acute inflammation, trauma and pregnancy. Correlation with other laboratory and clinical findings is essential.       Immature Granulocytes         0.6       INDIRECT GORDON       NEG         INR     0.9  Comment: Coumadin Therapy:    2.0 - 3.0 for INR for all indicators except mechanical heart valves    and antiphospholipid syndromes which should use 2.5 - 3.5.           Iron     11           Iron Saturation     <10           Lymph #         0.50       LYMPH %         7.7       Magnesium          2.0       MCH         27.5       MCHC         30.2       MCV          91       Mono #         0.69       Mono %         10.6       MPV         11.2       Neut #         5.3       Neut %         80.3       nRBC         0       OCCULT BLOOD STOOL Negative               Platelet Count         180       Potassium         4.7       PROTEIN TOTAL         6.4       PT     10.5           RBC         2.84       RDW         15.6       Retic     1.7           Sodium         139       Specimen Outdate       05/27/2025 23:59         TIBC     244           Transferrin     174           WBC         6.53                              Significant Imaging: I have reviewed all pertinent imaging results/findings within the past 24 hours.  Imaging Results              CT Pelvis Without Contrast (Final result)  Result time 05/24/25 15:03:43   Procedure changed from CT Hip Without Contrast Left     Final result by Yossi Cain MD (05/24/25 15:03:43)                   Impression:      Acute subcapital left femoral neck fracture with left hip lipohemarthrosis.      Electronically signed by: Yossi Cain  Date:    05/24/2025  Time:    15:03               Narrative:    EXAMINATION:  CT PELVIS WITHOUT CONTRAST    CLINICAL HISTORY:  Fracture, hip;Attention left hip/ femoral neck;    TECHNIQUE:  Pelvis CT without IV contrast    COMPARISON:  Radiographs 05/24/2025    FINDINGS:  Acute subcapital left femoral neck fracture is slightly impacted with apex anterior angulation suggested.    No additional fracture and no dislocation.  Moderate narrowing of left hip joint space is present.  Left hip lipohemarthrosis is evident.  Bones are diffusely demineralized.    Right hip and knee arthroplasty creates beam hardening artifact, slightly limiting evaluation.  Bones are diffusely demineralized.    Colonic diverticula are present.  Uterus has been removed.  Intestinal anastomosis partially visualized.    3 mm anterolisthesis of L4 on L5 are present.  Moderate L4-L5 disc degeneration is noted along with facet joint  osteoarthrosis at L4-L5.  Left sacrum bone island incidentally noted.                                       X-Ray Tibia Fibula 2 View Left (Final result)  Result time 05/24/25 13:22:01      Final result by Yossi Cain MD (05/24/25 13:22:01)                   Impression:      No acute abnormality of left tibia or fibula.      Electronically signed by: Yossi Cain  Date:    05/24/2025  Time:    13:22               Narrative:    EXAMINATION:  XR TIBIA FIBULA 2 VIEW LEFT    CLINICAL HISTORY:  Unspecified fall, initial encounter    FINDINGS:  Two views of left tibia and fibula show no fracture, dislocation, or destructive osseous lesion. Soft tissues are unremarkable. Bones are diffusely demineralized, slightly limiting evaluation.  Chondrocalcinosis noted in mediolateral left knee compartments.                                       X-Ray Knee 3 View Left (Final result)  Result time 05/24/25 13:21:35   Procedure changed from X-Ray Knee Complete 4 or more Views Left     Final result by Yossi Cain MD (05/24/25 13:21:35)                   Impression:      No acute left knee abnormality.      Electronically signed by: Yossi Cain  Date:    05/24/2025  Time:    13:21               Narrative:    EXAMINATION:  XR KNEE 3 VIEW LEFT    CLINICAL HISTORY:  Fall with left leg pain; Unspecified fall, initial encounter    FINDINGS:  Three views of left knee show no fracture, dislocation, or destructive osseous lesion. Chondrocalcinosis occurs in the medial and lateral compartments.  Negative for joint effusion.  Bones are diffusely demineralized, limiting evaluation.  Mild arterial vascular calcifications are present.                                       X-Ray Femur AP/LAT Left (Final result)  Result time 05/24/25 13:21:44      Final result by Yossi Cain MD (05/24/25 13:21:44)                   Impression:      Findings of high suspicion for acute left femoral neck fracture.      Electronically signed by: Yossi  Payton  Date:    05/24/2025  Time:    13:21               Narrative:    EXAMINATION:  XR FEMUR 2 VIEW LEFT    CLINICAL HISTORY:  Unspecified fall, initial encounter    FINDINGS:  Two views of left femur again show cortical angulation and lucency coursing through left femoral neck suspicious for nondisplaced acute fracture.  Bones are diffusely demineralized.  No dislocation.  Arterial vascular calcifications are mild.                                        X-Ray Hip 2 or 3 views Left with Pelvis when performed (Final result)  Result time 05/24/25 13:21:49      Final result by Yossi Cain MD (05/24/25 13:21:49)                   Impression:      Findings of high suspicion for acute nondisplaced left femoral neck fracture.    This report was flagged in Epic as abnormal.      Electronically signed by: Yossi Cain  Date:    05/24/2025  Time:    13:21               Narrative:    EXAMINATION:  XR HIP WITH PELVIS WHEN PERFORMED 2 OR 3 VIEWS LEFT    CLINICAL HISTORY:  Unspecified fall, initial encounter    FINDINGS:  AP pelvis and two views of left hip compared with 02/23/2023 show new abnormal angulation along superolateral left femoral head neck junction with some lucency coursing through the left femoral neck, suspicious for nondisplaced acute left femoral neck fracture.  Bones are diffusely demineralized.  Mild to moderate left hip joint space narrowing is suggested.    Left sacral ala bone island unchanged.  Right hip hemiarthroplasty remains in place.  No dislocation.  Soft tissues are unremarkable with surgical clips in left lower abdomen.

## 2025-05-25 NOTE — ANESTHESIA POSTPROCEDURE EVALUATION
Anesthesia Post Evaluation    Patient: Morena Mcclendon    Procedure(s) Performed: Procedure(s) (LRB):  ARTHROPLASTY, HIP (Left)    Final Anesthesia Type: general      Patient location during evaluation: PACU  Patient participation: Yes- Able to Participate  Level of consciousness: awake and alert and oriented  Post-procedure vital signs: reviewed and stable  Pain management: adequate  Airway patency: patent    PONV status at discharge: No PONV  Anesthetic complications: no      Cardiovascular status: blood pressure returned to baseline, hemodynamically stable and stable  Respiratory status: unassisted, spontaneous ventilation and room air  Hydration status: euvolemic  Follow-up not needed.              Vitals Value Taken Time   /83 05/25/25 10:35   Temp 36.1 °C (97 °F) 05/25/25 10:00   Pulse 106 05/25/25 10:45   Resp 13 05/25/25 10:45   SpO2 97 % 05/25/25 10:45   Vitals shown include unfiled device data.      No case tracking events are documented in the log.      Pain/Pineda Score: Pain Rating Prior to Med Admin: 4 (5/24/2025 12:56 PM)  Pain Rating Post Med Admin: 2 (5/24/2025  1:56 PM)  Pineda Score: 9 (5/25/2025 10:00 AM)

## 2025-05-25 NOTE — PROGRESS NOTES
[facility] Medicine  Progress Note    Patient Name: Morena Mcclendon  MRN: [id number]  Patient Class: IP- Inpatient   Admission Date: 5/24/2025  Length of Stay: 1 days  Attending Physician: Kush Sparks MD  Primary Care Provider: Faith Nichole MD        Subjective     Principal Problem:Femoral neck fracture        HPI:  89-year-old female presented to ED for eval s/p fall. pMHx dementia, anemia, colon cancer, cataract, CKD.  Patient is a poor historian as she has dementia, history obtained per ED report, chart review, and son who is at bedside.  It is reported patient fell yesterday evening while walking with home health staff when her legs gave out underneath her, patient was assisted to the ground by staff.  This morning, patient was unable to stand and bear weight to LLE 2/2 pain.  Patient's son states at baseline patient has shuffling gait with small steps and ambulates with assistance.  In ED today, patient found to have left hip fracture.  CT pelvis impression with Acute subcapital left femoral neck fracture with left hip lipohemarthrosis.  L knee XR and L tib/fib XR impressions without acute abnormality.  ED discussed case with ortho, plan is for surgery in a.m. Denies blood thinner use.  Basic labs pending.  Admit to hospital medicine for further eval.      Overview/Hospital Course:  No notes on file  Attempted to call son no answer.   Interval History:   Patient seen and examined postoperatively.  Pain well-controlled.  NAD.  VSS.  PT/OT consulted.   Review of Systems   Unable to perform ROS: Dementia   Musculoskeletal:  Positive for arthralgias.     Objective:     Vital Signs (Most Recent):  Temp: 98.2 °F (36.8 °C) (05/25/25 1120)  Pulse: 92 (05/25/25 1252)  Resp: 16 (05/25/25 1252)  BP: 136/82 (05/25/25 1120)  SpO2: 96 % (05/25/25 1252) Vital Signs (24h Range):  Temp:  [97 °F (36.1 °C)-98.9 °F (37.2 °C)] 98.2 °F (36.8 °C)  Pulse:  [] 92  Resp:  [13-20] 16  SpO2:  [92  %-99 %] 96 %  BP: (120-167)/(55-87) 136/82     Weight: 42.6 kg (93 lb 14.7 oz)  Body mass index is 16.64 kg/m².    Intake/Output Summary (Last 24 hours) at 5/25/2025 1332  Last data filed at 5/25/2025 0948  Gross per 24 hour   Intake 1305.15 ml   Output --   Net 1305.15 ml         Physical Exam  Vitals reviewed.   Constitutional:       General: She is not in acute distress.  HENT:      Head: Normocephalic and atraumatic.      Mouth/Throat:      Mouth: Mucous membranes are moist.   Eyes:      Conjunctiva/sclera: Conjunctivae normal.   Cardiovascular:      Rate and Rhythm: Normal rate and regular rhythm.      Pulses:           Dorsalis pedis pulses are 2+ on the left side.   Pulmonary:      Effort: Pulmonary effort is normal. No respiratory distress.   Abdominal:      Palpations: Abdomen is soft.      Tenderness: There is no abdominal tenderness.   Musculoskeletal:      Cervical back: Normal range of motion.      Right lower leg: No edema.      Left lower leg: No edema.      Comments: ROM limited LLE    Skin:     General: Skin is warm and dry.      Coloration: Skin is pale.      Findings: Bruising present.   Neurological:      Mental Status: Mental status is at baseline.   Psychiatric:         Mood and Affect: Mood normal.               Significant Labs: All pertinent labs within the past 24 hours have been reviewed.  Recent Lab Results  (Last 5 results in the past 24 hours)        05/25/25  0631   05/24/25  2339   05/24/25  2120   05/24/25  2039   05/24/25  1833        ABO and RH     O POS           Albumin         3.4       ALP         117       ALT         10       Anion Gap         7       PTT     26.1  Comment: Refer to local heparin nomogram for intensity/dose specific therapeutic range.           AST         11       Baso #         0.02       Basophil %         0.3       BILIRUBIN TOTAL         0.4  Comment: For infants and newborns, interpretation of results should be based   on gestational age, weight and in  agreement with clinical   observations.    Premature Infant recommended reference ranges:   0-24 hours:  <8.0 mg/dL   24-48 hours: <12.0 mg/dL   3-5 days:    <15.0 mg/dL   6-29 days:   <15.0 mg/dL       BNP         245  Comment: Values of less than 100 pg/ml are consistent with non-CHF populations.       BUN         31       Calcium         8.3       Chloride         113       CO2         19       Creatinine         1.6       eGFR         30       Eos #         0.03       Eos %         0.5       Ferritin     158.6           Glucose         113       Group & Rh       O POS         Hematocrit   27.1       25.8       Hemoglobin   8.1       7.8       Immature Grans (Abs)         0.04  Comment: Mild elevation in immature granulocytes is non specific and can be seen in a variety of conditions including stress response, acute inflammation, trauma and pregnancy. Correlation with other laboratory and clinical findings is essential.       Immature Granulocytes         0.6       INDIRECT GORDON       NEG         INR     0.9  Comment: Coumadin Therapy:    2.0 - 3.0 for INR for all indicators except mechanical heart valves    and antiphospholipid syndromes which should use 2.5 - 3.5.           Iron     11           Iron Saturation     <10           Lymph #         0.50       LYMPH %         7.7       Magnesium          2.0       MCH         27.5       MCHC         30.2       MCV         91       Mono #         0.69       Mono %         10.6       MPV         11.2       Neut #         5.3       Neut %         80.3       nRBC         0       OCCULT BLOOD STOOL Negative               Platelet Count         180       Potassium         4.7       PROTEIN TOTAL         6.4       PT     10.5           RBC         2.84       RDW         15.6       Retic     1.7           Sodium         139       Specimen Outdate       05/27/2025 23:59         TIBC     244           Transferrin     174           WBC         6.53                               Significant Imaging: I have reviewed all pertinent imaging results/findings within the past 24 hours.  Imaging Results              CT Pelvis Without Contrast (Final result)  Result time 05/24/25 15:03:43   Procedure changed from CT Hip Without Contrast Left     Final result by Yossi Cain MD (05/24/25 15:03:43)                   Impression:      Acute subcapital left femoral neck fracture with left hip lipohemarthrosis.      Electronically signed by: Yossi Cain  Date:    05/24/2025  Time:    15:03               Narrative:    EXAMINATION:  CT PELVIS WITHOUT CONTRAST    CLINICAL HISTORY:  Fracture, hip;Attention left hip/ femoral neck;    TECHNIQUE:  Pelvis CT without IV contrast    COMPARISON:  Radiographs 05/24/2025    FINDINGS:  Acute subcapital left femoral neck fracture is slightly impacted with apex anterior angulation suggested.    No additional fracture and no dislocation.  Moderate narrowing of left hip joint space is present.  Left hip lipohemarthrosis is evident.  Bones are diffusely demineralized.    Right hip and knee arthroplasty creates beam hardening artifact, slightly limiting evaluation.  Bones are diffusely demineralized.    Colonic diverticula are present.  Uterus has been removed.  Intestinal anastomosis partially visualized.    3 mm anterolisthesis of L4 on L5 are present.  Moderate L4-L5 disc degeneration is noted along with facet joint osteoarthrosis at L4-L5.  Left sacrum bone island incidentally noted.                                       X-Ray Tibia Fibula 2 View Left (Final result)  Result time 05/24/25 13:22:01      Final result by Yossi Cain MD (05/24/25 13:22:01)                   Impression:      No acute abnormality of left tibia or fibula.      Electronically signed by: Yossi Cain  Date:    05/24/2025  Time:    13:22               Narrative:    EXAMINATION:  XR TIBIA FIBULA 2 VIEW LEFT    CLINICAL HISTORY:  Unspecified fall, initial encounter    FINDINGS:  Two views  of left tibia and fibula show no fracture, dislocation, or destructive osseous lesion. Soft tissues are unremarkable. Bones are diffusely demineralized, slightly limiting evaluation.  Chondrocalcinosis noted in mediolateral left knee compartments.                                       X-Ray Knee 3 View Left (Final result)  Result time 05/24/25 13:21:35   Procedure changed from X-Ray Knee Complete 4 or more Views Left     Final result by Yossi Cain MD (05/24/25 13:21:35)                   Impression:      No acute left knee abnormality.      Electronically signed by: Yossi Cain  Date:    05/24/2025  Time:    13:21               Narrative:    EXAMINATION:  XR KNEE 3 VIEW LEFT    CLINICAL HISTORY:  Fall with left leg pain; Unspecified fall, initial encounter    FINDINGS:  Three views of left knee show no fracture, dislocation, or destructive osseous lesion. Chondrocalcinosis occurs in the medial and lateral compartments.  Negative for joint effusion.  Bones are diffusely demineralized, limiting evaluation.  Mild arterial vascular calcifications are present.                                       X-Ray Femur AP/LAT Left (Final result)  Result time 05/24/25 13:21:44      Final result by Yossi Cain MD (05/24/25 13:21:44)                   Impression:      Findings of high suspicion for acute left femoral neck fracture.      Electronically signed by: Yossi Cain  Date:    05/24/2025  Time:    13:21               Narrative:    EXAMINATION:  XR FEMUR 2 VIEW LEFT    CLINICAL HISTORY:  Unspecified fall, initial encounter    FINDINGS:  Two views of left femur again show cortical angulation and lucency coursing through left femoral neck suspicious for nondisplaced acute fracture.  Bones are diffusely demineralized.  No dislocation.  Arterial vascular calcifications are mild.                                        X-Ray Hip 2 or 3 views Left with Pelvis when performed (Final result)  Result time 05/24/25 13:21:49       Final result by Yossi Cain MD (05/24/25 13:21:49)                   Impression:      Findings of high suspicion for acute nondisplaced left femoral neck fracture.    This report was flagged in Epic as abnormal.      Electronically signed by: Yossi Cain  Date:    05/24/2025  Time:    13:21               Narrative:    EXAMINATION:  XR HIP WITH PELVIS WHEN PERFORMED 2 OR 3 VIEWS LEFT    CLINICAL HISTORY:  Unspecified fall, initial encounter    FINDINGS:  AP pelvis and two views of left hip compared with 02/23/2023 show new abnormal angulation along superolateral left femoral head neck junction with some lucency coursing through the left femoral neck, suspicious for nondisplaced acute left femoral neck fracture.  Bones are diffusely demineralized.  Mild to moderate left hip joint space narrowing is suggested.    Left sacral ala bone island unchanged.  Right hip hemiarthroplasty remains in place.  No dislocation.  Soft tissues are unremarkable with surgical clips in left lower abdomen.                                         Assessment & Plan  Femoral neck fracture  Monitor daily CBC  Pain control  Neurovascular checks  PT/OT  Fall precautions  Consult ortho  Status post femur repair  Stage 3b chronic kidney disease  Creatine stable for now. CMP reviewed- noted Estimated Creatinine Clearance: 15.4 mL/min (A) (based on SCr of 1.6 mg/dL (H)). according to latest data. Based on current GFR, CKD stage is stage 3 - GFR 30-59.  Monitor UOP and serial CMP and adjust therapy as needed. Renally dose meds. Avoid nephrotoxic medications and procedures.  Dementia  Patient with dementia with likely etiology of unknown dementia.  The patient does not have signs of behavioral disturbance. Continue non-pharmacologic interventions to prevent delirium (Activity during day, opening blinds, providing glasses/hearing aids, and up in chair during daytime). Will avoid narcotics and benzos unless absolutely necessary.   Anemia  Anemia  etiology work up in progress, in with femur fracture. Most recent hemoglobin and hematocrit are listed below.  Recent Labs     05/24/25  1833 05/24/25  2339   HGB 7.8* 8.1*   HCT 25.8* 27.1*     Plan  - Monitor serial CBC: Daily  - Transfuse PRBC if patient becomes hemodynamically unstable, symptomatic or H/H drops below 7/21.  - Patient has not received any PRBC transfusions this admission  - Patient's anemia is currently stable  -Type and screen, retic count, iron studies, coags  -Occult stool  - Repeat h&h at 0000  -Measure thigh circumference  -Protonix  VTE Risk Mitigation (From admission, onward)           Ordered     Reason for No Pharmacological VTE Prophylaxis  Once        Question:  Reasons:  Answer:  Risk of Bleeding    05/24/25 1645     IP VTE HIGH RISK PATIENT  Once         05/24/25 1645     Place sequential compression device  Until discontinued         05/24/25 1645                    Discharge Planning   ENRRIQUE: 5/27/2025     Code Status: Full Code   Medical Readiness for Discharge Date:                    Please place Justification for DME        Lee Ortiz NP  Department of Hospital Medicine   Formerly [facility], later [facility]

## 2025-05-25 NOTE — ANESTHESIA PREPROCEDURE EVALUATION
05/25/2025  Morena Mcclendon is a 89 y.o., female.    Problem List[1]    Past Surgical History:   Procedure Laterality Date    APPENDECTOMY      COLON SURGERY      EYE SURGERY      HERNIA REPAIR      HYSTERECTOMY      tumor removed  2014        Tobacco Use:  The patient  reports that she has never smoked. She has never used smokeless tobacco.     No results found for this or any previous visit.     Imaging Results              CT Pelvis Without Contrast (Final result)  Result time 05/24/25 15:03:43   Procedure changed from CT Hip Without Contrast Left     Final result by Yossi Cain MD (05/24/25 15:03:43)                   Impression:      Acute subcapital left femoral neck fracture with left hip lipohemarthrosis.      Electronically signed by: Yossi Cain  Date:    05/24/2025  Time:    15:03               Narrative:    EXAMINATION:  CT PELVIS WITHOUT CONTRAST    CLINICAL HISTORY:  Fracture, hip;Attention left hip/ femoral neck;    TECHNIQUE:  Pelvis CT without IV contrast    COMPARISON:  Radiographs 05/24/2025    FINDINGS:  Acute subcapital left femoral neck fracture is slightly impacted with apex anterior angulation suggested.    No additional fracture and no dislocation.  Moderate narrowing of left hip joint space is present.  Left hip lipohemarthrosis is evident.  Bones are diffusely demineralized.    Right hip and knee arthroplasty creates beam hardening artifact, slightly limiting evaluation.  Bones are diffusely demineralized.    Colonic diverticula are present.  Uterus has been removed.  Intestinal anastomosis partially visualized.    3 mm anterolisthesis of L4 on L5 are present.  Moderate L4-L5 disc degeneration is noted along with facet joint osteoarthrosis at L4-L5.  Left sacrum bone island incidentally noted.                                       X-Ray Tibia Fibula 2 View Left (Final  result)  Result time 05/24/25 13:22:01      Final result by Yossi Cain MD (05/24/25 13:22:01)                   Impression:      No acute abnormality of left tibia or fibula.      Electronically signed by: Yossi Cain  Date:    05/24/2025  Time:    13:22               Narrative:    EXAMINATION:  XR TIBIA FIBULA 2 VIEW LEFT    CLINICAL HISTORY:  Unspecified fall, initial encounter    FINDINGS:  Two views of left tibia and fibula show no fracture, dislocation, or destructive osseous lesion. Soft tissues are unremarkable. Bones are diffusely demineralized, slightly limiting evaluation.  Chondrocalcinosis noted in mediolateral left knee compartments.                                       X-Ray Knee 3 View Left (Final result)  Result time 05/24/25 13:21:35   Procedure changed from X-Ray Knee Complete 4 or more Views Left     Final result by Yossi Cain MD (05/24/25 13:21:35)                   Impression:      No acute left knee abnormality.      Electronically signed by: Yossi Cain  Date:    05/24/2025  Time:    13:21               Narrative:    EXAMINATION:  XR KNEE 3 VIEW LEFT    CLINICAL HISTORY:  Fall with left leg pain; Unspecified fall, initial encounter    FINDINGS:  Three views of left knee show no fracture, dislocation, or destructive osseous lesion. Chondrocalcinosis occurs in the medial and lateral compartments.  Negative for joint effusion.  Bones are diffusely demineralized, limiting evaluation.  Mild arterial vascular calcifications are present.                                       X-Ray Femur AP/LAT Left (Final result)  Result time 05/24/25 13:21:44      Final result by Yossi Cain MD (05/24/25 13:21:44)                   Impression:      Findings of high suspicion for acute left femoral neck fracture.      Electronically signed by: Yossi Cain  Date:    05/24/2025  Time:    13:21               Narrative:    EXAMINATION:  XR FEMUR 2 VIEW LEFT    CLINICAL HISTORY:  Unspecified fall, initial  encounter    FINDINGS:  Two views of left femur again show cortical angulation and lucency coursing through left femoral neck suspicious for nondisplaced acute fracture.  Bones are diffusely demineralized.  No dislocation.  Arterial vascular calcifications are mild.                                        X-Ray Hip 2 or 3 views Left with Pelvis when performed (Final result)  Result time 05/24/25 13:21:49      Final result by oYssi Cain MD (05/24/25 13:21:49)                   Impression:      Findings of high suspicion for acute nondisplaced left femoral neck fracture.    This report was flagged in Epic as abnormal.      Electronically signed by: Yossi Cain  Date:    05/24/2025  Time:    13:21               Narrative:    EXAMINATION:  XR HIP WITH PELVIS WHEN PERFORMED 2 OR 3 VIEWS LEFT    CLINICAL HISTORY:  Unspecified fall, initial encounter    FINDINGS:  AP pelvis and two views of left hip compared with 02/23/2023 show new abnormal angulation along superolateral left femoral head neck junction with some lucency coursing through the left femoral neck, suspicious for nondisplaced acute left femoral neck fracture.  Bones are diffusely demineralized.  Mild to moderate left hip joint space narrowing is suggested.    Left sacral ala bone island unchanged.  Right hip hemiarthroplasty remains in place.  No dislocation.  Soft tissues are unremarkable with surgical clips in left lower abdomen.                                       Lab Results   Component Value Date    WBC 6.53 05/24/2025    HGB 8.1 (L) 05/24/2025    HCT 27.1 (L) 05/24/2025    MCV 91 05/24/2025     05/24/2025     BMP  Lab Results   Component Value Date     05/24/2025    K 4.7 05/24/2025     (H) 05/24/2025    CO2 19 (L) 05/24/2025    BUN 31 (H) 05/24/2025    CREATININE 1.6 (H) 05/24/2025    CALCIUM 8.3 (L) 05/24/2025    ANIONGAP 7 (L) 05/24/2025     (H) 05/24/2025    GLU 83 10/07/2022    GLU 94 01/31/2020       No results found  for this or any previous visit.           Pre-op Assessment    I have reviewed the Patient Summary Reports.     I have reviewed the Nursing Notes. I have reviewed the NPO Status.   I have reviewed the Medications.     Review of Systems  Anesthesia Hx:  No problems with previous Anesthesia   Neg history of prior surgery.          Denies Family Hx of Anesthesia complications.    Denies Personal Hx of Anesthesia complications.                    Hematology/Oncology:    Oncology Normal    -- Anemia:                                  EENT/Dental:  EENT/Dental Normal           Cardiovascular:  Cardiovascular Normal                                              Pulmonary:  Pulmonary Normal                       Renal/:  Chronic Renal Disease, CKD                Hepatic/GI:  Hepatic/GI Normal                    Musculoskeletal:  Musculoskeletal Normal                Neurological:  Neurology Normal                                      Endocrine:  Endocrine Normal            Dermatological:  Skin Normal    Psych:  Psychiatric History (Dementia)                  Physical Exam  General: Cooperative    Airway:  Mallampati: II   Mouth Opening: Normal  TM Distance: Normal  Tongue: Normal  Neck ROM: Normal ROM    Chest/Lungs:  Clear to auscultation, Normal Respiratory Rate    Heart:  Rate: Normal  Rhythm: Regular Rhythm  Sounds: Normal        Anesthesia Plan  Type of Anesthesia, risks & benefits discussed:    Anesthesia Type: Gen ETT  Intra-op Monitoring Plan: Standard ASA Monitors  Post Op Pain Control Plan: multimodal analgesia  Induction:  IV  Airway Plan: Video, Post-Induction  Informed Consent: Informed consent signed with the Patient representative and all parties understand the risks and agree with anesthesia plan.  All questions answered. Patient consented to blood products? Yes  ASA Score: 3  Anesthesia Plan Notes: TIVA    Ready For Surgery From Anesthesia Perspective.     .           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

## 2025-05-25 NOTE — OP NOTE
[facility]  Total Hip Arthroplasty   Procedure Note    SUMMARY     Date of Procedure: 5/25/2025     Procedure: Procedure(s) (LRB):  ARTHROPLASTY, HIP (Left)     Surgeons and Role:     * Adan Matos MD - Primary    Assisting Surgeon: crissy Mcclure    Indications:  Femoral neck fracture with severe osteoarthritis of the left hip    Pre-Operative Diagnosis: Femoral neck fracture with severe osteoarthritis of the left hip    Post-Operative Diagnosis: Femoral neck fracture with severe osteoarthritis of the left hip    Anesthesia: General    Technical Procedures Used:  Posterior hip    Description of the Findings of the Procedure:   The patient was identified in the preoperative holding area and the  Left Hip was marked.  The patient was then taken to the operative room where they were placed under general anesthesia and then moved to the bed where they were placed in the lateral decubitus position with the assistance of a pegboard.  An axillary roll as well as a peroneal nerve padding was placed and then the 4 pegs were put in to allow for good stability.    The patient was then prepped and draped in a standard fashion and then a time-out was performed, perioperative antibiotics and TXA were given.  A modified posterolateral approach was then used and after incising through the skin Bovie cautery was used to get down to the fascia.  The hip was then internally rotated and the Charnley retractors were placed.  The bursa was then removed followed by identifying the piriformis and short external rotators as well as the capsule which were all then taken down and saved.  At this point the hip was dislocated and the fractured femoral neck broke off.  A fresh cut was made over femoral neck and then the head was removed and measured.    The labrum was then identified and removed sequentially using combination of Bovie and a long-handled knife.  After that was done we sequentially reamed in the hip until  we got to a size 47  And then placed a size 47  Trial.  We used the version guide for the procedure and found this to be in good position.  This trial was then removed and the final size 48 G7 Biomet cup was placed.  We then decided to do one 30 mm  screws, and followed that up with placing the dual mobility liner.  Once this was malleted into place the cup and liner were tested and found to be taut and in good position     Next we turned our attention to the femur and the femoral elevator was used.  Once that was done we used a box osteotome followed by the canal Finder and lateralizer and then we sequentially broached to a size 2 std offsteavenir with a collared stem.  We trialed several different head and neck combinations ultimately ending up with +3.5 .  Once that was done the trials were thoroughly tested for instability including an external rotation internal rotation bending up to 120, internal rotation at 90 and at 45 and the leg lengths were found to be equal.  Once that was done the trials were then removed the wounds were copiously irrigated and the final stem size avenir with a collar was placed.  Once that was done we placed the final dual mobility head combination which included suctioning down the dual mobility head on the back table.  This was then placed and the hip was reduced.  Once again stability was tested and found to be adequate with equal leg lengths.  The wounds were then irrigated 1 last time and the fascia and fat were closed with a 1 Stratafix.  The skin was closed with 2-0 Vicryl and 3-0 running Monocryl with skin glue and an Aquacel.  The patient was then extubated and taken to the PACU in stable condition     Significant Surgical Tasks Conducted by the Assistant(s), if Applicable:  A 1st assist was used for this case and was vital throughout for fracture removal, implant placement, closure, patient positioning.    Complications: None; patient tolerated the procedure well.    Total IV  Fluids:  see anesthesiaml    Estimated Blood Loss (EBL): less than 100 mL           Drains: none    Implants:  Dannielle Biomet G7 size 48 cup, dual mobility liner, size 2 avenue ear standard offset with a +3.5 dual mobility head    Specimens:  None           Condition:stable    Disposition: PACU - hemodynamically stable.    Attestation: I was present and scrubbed for the entire procedure.

## 2025-05-25 NOTE — TRANSFER OF CARE
"Anesthesia Transfer of Care Note    Patient: Morena Mcclendon    Procedure(s) Performed: Procedure(s) (LRB):  ARTHROPLASTY, HIP (Left)    Patient location: PACU    Anesthesia Type: general    Transport from OR: Transported from OR on 2-3 L/min O2 by NC with adequate spontaneous ventilation    Post pain: adequate analgesia    Post assessment: no apparent anesthetic complications and tolerated procedure well    Post vital signs: stable    Level of consciousness: awake    Nausea/Vomiting: no nausea/vomiting    Complications: none    Transfer of care protocol was followed      Last vitals: Visit Vitals  BP (!) 167/55   Pulse 65   Temp 36.6 °C (97.8 °F)   Resp 16   Ht 5' 3" (1.6 m)   Wt 42.6 kg (93 lb 14.7 oz)   SpO2 98%   Breastfeeding No   BMI 16.64 kg/m²     "

## 2025-05-25 NOTE — NURSING
Pt arrived to unit via bed, surgical dressing to left hip C/D/I, abductor pillow intact. Pt denies pain, IVF restarted, family at side. NP notified pt is back in room. MARGIE, POC ongoing.

## 2025-05-25 NOTE — PT/OT/SLP PROGRESS
Physical Therapy      Patient Name:  Morena cMclendon   MRN:  [id number]    Patient not seen today secondary to Nurse/ GIOVANY hold, Other (Comment) (having sx today). Will follow-up 5/26/25.

## 2025-05-25 NOTE — ANESTHESIA PROCEDURE NOTES
Intubation    Date/Time: 5/25/2025 8:54 AM    Performed by: Ritu Rodgers CRNA  Authorized by: Quinn Whiting Jr., MD    Intubation:     Induction:  Intravenous    Intubated:  Postinduction    Mask Ventilation:  Easy mask    Attempts:  1    Attempted By:  CRNA    Method of Intubation:  Video laryngoscopy    Blade:  Reed 3    Laryngeal View Grade: Grade I - full view of cords      Difficult Airway Encountered?: No      Complications:  None    Airway Device:  Oral endotracheal tube    Airway Device Size:  7.0    Style/Cuff Inflation:  Cuffed (inflated to minimal occlusive pressure)    Tube secured:  21    Secured at:  The lips    Placement Verified By:  Capnometry    Complicating Factors:  None    Findings Post-Intubation:  BS equal bilateral and atraumatic/condition of teeth unchanged

## 2025-05-25 NOTE — NURSING
Pt transported off unit via bed, taken for scheduled procedure. Telemetry intact, family at side. MARGIE.

## 2025-05-25 NOTE — ASSESSMENT & PLAN NOTE
Anemia etiology work up in progress, in with femur fracture. Most recent hemoglobin and hematocrit are listed below.  Recent Labs     05/24/25  1833 05/24/25  2339   HGB 7.8* 8.1*   HCT 25.8* 27.1*     Plan  - Monitor serial CBC: Daily  - Transfuse PRBC if patient becomes hemodynamically unstable, symptomatic or H/H drops below 7/21.  - Patient has not received any PRBC transfusions this admission  - Patient's anemia is currently stable  -Type and screen, retic count, iron studies, coags  -Occult stool  - Repeat h&h at 0000  -Measure thigh circumference  -Protonix

## 2025-05-25 NOTE — PLAN OF CARE
SW attempted to complete dc assessment Pt currently  in periop; SW to try again later    05/25/25 4512   Discharge Assessment   Assessment Type Discharge Planning Assessment

## 2025-05-26 LAB
ABO + RH BLD: NORMAL
ABSOLUTE EOSINOPHIL (SMH): 0 K/UL
ABSOLUTE MONOCYTE (SMH): 0.67 K/UL (ref 0.3–1)
ABSOLUTE NEUTROPHIL COUNT (SMH): 6.1 K/UL (ref 1.8–7.7)
ALBUMIN SERPL-MCNC: 3.1 G/DL (ref 3.5–5.2)
ALP SERPL-CCNC: 87 UNIT/L (ref 55–135)
ALT SERPL-CCNC: 6 UNIT/L (ref 10–44)
ANION GAP (SMH): 7 MMOL/L (ref 8–16)
AST SERPL-CCNC: 10 UNIT/L (ref 10–40)
BASOPHILS # BLD AUTO: 0.01 K/UL
BASOPHILS NFR BLD AUTO: 0.1 %
BILIRUB SERPL-MCNC: 0.4 MG/DL (ref 0.1–1)
BLD PROD TYP BPU: NORMAL
BLOOD UNIT EXPIRATION DATE: NORMAL
BLOOD UNIT TYPE CODE: 5100
BUN SERPL-MCNC: 28 MG/DL (ref 10–30)
CALCIUM SERPL-MCNC: 8 MG/DL (ref 8.7–10.5)
CHLORIDE SERPL-SCNC: 113 MMOL/L (ref 95–110)
CO2 SERPL-SCNC: 21 MMOL/L (ref 23–29)
CREAT SERPL-MCNC: 1.7 MG/DL (ref 0.5–1.4)
CROSSMATCH INTERPRETATION: NORMAL
DISPENSE STATUS: NORMAL
ERYTHROCYTE [DISTWIDTH] IN BLOOD BY AUTOMATED COUNT: 15.5 % (ref 11.5–14.5)
GFR SERPLBLD CREATININE-BSD FMLA CKD-EPI: 28 ML/MIN/1.73/M2
GLUCOSE SERPL-MCNC: 118 MG/DL (ref 70–110)
HCT VFR BLD AUTO: 22.6 % (ref 37–48.5)
HGB BLD-MCNC: 6.7 GM/DL (ref 12–16)
IMM GRANULOCYTES # BLD AUTO: 0.07 K/UL (ref 0–0.04)
IMM GRANULOCYTES NFR BLD AUTO: 1 % (ref 0–0.5)
LYMPHOCYTES # BLD AUTO: 0.43 K/UL (ref 1–4.8)
MAGNESIUM SERPL-MCNC: 1.6 MG/DL (ref 1.6–2.6)
MCH RBC QN AUTO: 27.3 PG (ref 27–31)
MCHC RBC AUTO-ENTMCNC: 29.6 G/DL (ref 32–36)
MCV RBC AUTO: 92 FL (ref 82–98)
NUCLEATED RBC (/100WBC) (SMH): 0 /100 WBC
OHS QRS DURATION: 78 MS
OHS QTC CALCULATION: 415 MS
PLATELET # BLD AUTO: 176 K/UL (ref 150–450)
PMV BLD AUTO: 11.7 FL (ref 9.2–12.9)
POTASSIUM SERPL-SCNC: 4.4 MMOL/L (ref 3.5–5.1)
PROT SERPL-MCNC: 5.8 GM/DL (ref 6–8.4)
RBC # BLD AUTO: 2.45 M/UL (ref 4–5.4)
RELATIVE EOSINOPHIL (SMH): 0 % (ref 0–8)
RELATIVE LYMPHOCYTE (SMH): 5.9 % (ref 18–48)
RELATIVE MONOCYTE (SMH): 9.2 % (ref 4–15)
RELATIVE NEUTROPHIL (SMH): 83.8 % (ref 38–73)
SODIUM SERPL-SCNC: 141 MMOL/L (ref 136–145)
UNIT NUMBER: NORMAL
WBC # BLD AUTO: 7.3 K/UL (ref 3.9–12.7)

## 2025-05-26 PROCEDURE — 36415 COLL VENOUS BLD VENIPUNCTURE: CPT | Performed by: STUDENT IN AN ORGANIZED HEALTH CARE EDUCATION/TRAINING PROGRAM

## 2025-05-26 PROCEDURE — 63600175 PHARM REV CODE 636 W HCPCS

## 2025-05-26 PROCEDURE — 99900035 HC TECH TIME PER 15 MIN (STAT)

## 2025-05-26 PROCEDURE — 85025 COMPLETE CBC W/AUTO DIFF WBC: CPT | Performed by: STUDENT IN AN ORGANIZED HEALTH CARE EDUCATION/TRAINING PROGRAM

## 2025-05-26 PROCEDURE — 25000003 PHARM REV CODE 250

## 2025-05-26 PROCEDURE — 94761 N-INVAS EAR/PLS OXIMETRY MLT: CPT

## 2025-05-26 PROCEDURE — 11000001 HC ACUTE MED/SURG PRIVATE ROOM

## 2025-05-26 PROCEDURE — 80053 COMPREHEN METABOLIC PANEL: CPT | Performed by: STUDENT IN AN ORGANIZED HEALTH CARE EDUCATION/TRAINING PROGRAM

## 2025-05-26 PROCEDURE — 63600175 PHARM REV CODE 636 W HCPCS: Performed by: STUDENT IN AN ORGANIZED HEALTH CARE EDUCATION/TRAINING PROGRAM

## 2025-05-26 PROCEDURE — 83735 ASSAY OF MAGNESIUM: CPT | Performed by: STUDENT IN AN ORGANIZED HEALTH CARE EDUCATION/TRAINING PROGRAM

## 2025-05-26 PROCEDURE — 30233N1 TRANSFUSION OF NONAUTOLOGOUS RED BLOOD CELLS INTO PERIPHERAL VEIN, PERCUTANEOUS APPROACH: ICD-10-PCS | Performed by: STUDENT IN AN ORGANIZED HEALTH CARE EDUCATION/TRAINING PROGRAM

## 2025-05-26 PROCEDURE — 25000003 PHARM REV CODE 250: Performed by: STUDENT IN AN ORGANIZED HEALTH CARE EDUCATION/TRAINING PROGRAM

## 2025-05-26 PROCEDURE — P9016 RBC LEUKOCYTES REDUCED: HCPCS | Performed by: STUDENT IN AN ORGANIZED HEALTH CARE EDUCATION/TRAINING PROGRAM

## 2025-05-26 PROCEDURE — 97166 OT EVAL MOD COMPLEX 45 MIN: CPT

## 2025-05-26 PROCEDURE — 99900031 HC PATIENT EDUCATION (STAT)

## 2025-05-26 PROCEDURE — 86920 COMPATIBILITY TEST SPIN: CPT | Performed by: STUDENT IN AN ORGANIZED HEALTH CARE EDUCATION/TRAINING PROGRAM

## 2025-05-26 RX ORDER — ACETAMINOPHEN 325 MG/1
650 TABLET ORAL
Status: DISCONTINUED | OUTPATIENT
Start: 2025-05-26 | End: 2025-05-28 | Stop reason: HOSPADM

## 2025-05-26 RX ORDER — SODIUM CHLORIDE 9 MG/ML
INJECTION, SOLUTION INTRAVENOUS CONTINUOUS
Status: DISCONTINUED | OUTPATIENT
Start: 2025-05-26 | End: 2025-05-28 | Stop reason: HOSPADM

## 2025-05-26 RX ORDER — DIPHENHYDRAMINE HYDROCHLORIDE 50 MG/ML
25 INJECTION, SOLUTION INTRAMUSCULAR; INTRAVENOUS
Status: DISCONTINUED | OUTPATIENT
Start: 2025-05-26 | End: 2025-05-28 | Stop reason: HOSPADM

## 2025-05-26 RX ORDER — PANTOPRAZOLE SODIUM 40 MG/1
40 TABLET, DELAYED RELEASE ORAL
Status: DISCONTINUED | OUTPATIENT
Start: 2025-05-27 | End: 2025-05-28 | Stop reason: HOSPADM

## 2025-05-26 RX ORDER — HYDROCODONE BITARTRATE AND ACETAMINOPHEN 500; 5 MG/1; MG/1
TABLET ORAL
Status: DISCONTINUED | OUTPATIENT
Start: 2025-05-26 | End: 2025-05-28 | Stop reason: HOSPADM

## 2025-05-26 RX ADMIN — SODIUM CHLORIDE: 9 INJECTION, SOLUTION INTRAVENOUS at 04:05

## 2025-05-26 RX ADMIN — ACETAMINOPHEN 650 MG: 325 TABLET ORAL at 10:05

## 2025-05-26 RX ADMIN — SODIUM CHLORIDE: 9 INJECTION, SOLUTION INTRAVENOUS at 03:05

## 2025-05-26 RX ADMIN — THERA TABS 1 TABLET: TAB at 10:05

## 2025-05-26 RX ADMIN — PANTOPRAZOLE SODIUM 40 MG: 40 INJECTION, POWDER, FOR SOLUTION INTRAVENOUS at 10:05

## 2025-05-26 RX ADMIN — MUPIROCIN 1 G: 20 OINTMENT TOPICAL at 08:05

## 2025-05-26 RX ADMIN — DIPHENHYDRAMINE HYDROCHLORIDE 25 MG: 50 INJECTION, SOLUTION INTRAMUSCULAR; INTRAVENOUS at 10:05

## 2025-05-26 RX ADMIN — Medication 1 TABLET: at 10:05

## 2025-05-26 RX ADMIN — MUPIROCIN 1 G: 20 OINTMENT TOPICAL at 10:05

## 2025-05-26 RX ADMIN — POLYETHYLENE GLYCOL 3350 17 G: 17 POWDER, FOR SOLUTION ORAL at 10:05

## 2025-05-26 RX ADMIN — Medication 1 TABLET: at 08:05

## 2025-05-26 NOTE — PLAN OF CARE
[facility]  Initial Discharge Assessment       Primary Care Provider: Faith Nichole MD    Admission Diagnosis: Femoral neck fracture [S72.009A]  Fall [W19.XXXA]    Admission Date: 5/24/2025  Expected Discharge Date: 5/27/2025    SW met with patient bedside. Kevin, son, Hanna, daughter, and Rosalva, daughter-in-law, were present in the room assisting with assessment. SW verified demographics, insurance, supports, and PCP.  Patient's family reported patient lives in the home with Rosalva Brown, and their adult child. Patient's family reported Kevin, son, brings her to appointments. Patient's family reported patient has private caregivers that come to the home daily to assist with care. SW assessed patient's needs. Patient is unable to complete ADLs independently. Patient's family verified at home DME: power lift chair, shower chair, cane.  Patient's family verified No HH, No Dialysis, No Blood Thinners, and No Oxygen.     Patient's family verified pharmacy of choice: [facility]  Patient's family confirmed Kevin, son, will be source of transportation at the time of discharge.     Transition of Care Barriers: None    Payor: MEDICARE / Plan: MEDICARE PART A & B / Product Type: Government /     Extended Emergency Contact Information  Primary Emergency Contact: Rosalva Mcclendon  Mobile Phone: [phone]  Relation: Other  Preferred language: English   needed? No  Secondary Emergency Contact: Kevin Lopez  Address: [address] [place] [address] [address] [place] [address]  Home Phone: [phone]  Mobile Phone: [phone]  Relation: Son   needed? No    Discharge Plan A: Home with family  Discharge Plan B: Home with family      [id number] - [place], [place] - [address] AT [facility] [address]  [address]  [facility] [address]  Phone: [phone] Fax: [phone]      Initial Assessment (most recent)       Adult Discharge Assessment -  05/26/25 0927          Discharge Assessment    Assessment Type Discharge Planning Assessment     Confirmed/corrected address, phone number and insurance Yes     Confirmed Demographics Correct on Facesheet     Source of Information family     If unable to respond/provide information was family/caregiver contacted? Yes     Communicated ENRRIQUE with patient/caregiver Date not available/Unable to determine     People in Home child(elvira), adult;grandchild(elvira)     Do you expect to return to your current living situation? Yes     Do you have help at home or someone to help you manage your care at home? Yes     Who are your caregiver(s) and their phone number(s)? Patient's sonKevin, reported they pay for private caretakers to be in the home and help his mother.     Prior to hospitilization cognitive status: Unable to Assess     Current cognitive status: Unable to Assess     Walking or Climbing Stairs Difficulty yes     Walking or Climbing Stairs ambulation difficulty, assistance 1 person;stair climbing difficulty, assistance 1 person;transferring difficulty, assistance 1 person     Dressing/Bathing Difficulty yes     Dressing/Bathing bathing difficulty, assistance 1 person;dressing difficulty, assistance 1 person     Home Accessibility wheelchair accessible     Home Layout Able to live on 1st floor     Equipment Currently Used at Home shower chair;cane, straight;power chair     Readmission within 30 days? No     Patient currently being followed by outpatient case management? No     Do you currently have service(s) that help you manage your care at home? No     Do you take prescription medications? Yes     Do you have prescription coverage? Yes     Do you have any problems affording any of your prescribed medications? No     Is the patient taking medications as prescribed? yes     Who is going to help you get home at discharge? Kevin howell     How do you get to doctors appointments? family or friend will provide     Are you on  dialysis? No     Do you take coumadin? No     Discharge Plan A Home with family     Discharge Plan B Home with family     DME Needed Upon Discharge  none     Discharge Plan discussed with: Adult children     Transition of Care Barriers None

## 2025-05-26 NOTE — PROGRESS NOTES
[facility] Medicine  Progress Note    Patient Name: Morena Mcclendon  MRN: [id number]  Patient Class: IP- Inpatient   Admission Date: 5/24/2025  Length of Stay: 2 days  Attending Physician: Gibran Hough MD  Primary Care Provider: Faith Nichole MD        Subjective     Principal Problem:Femoral neck fracture        HPI:  89-year-old female presented to ED for eval s/p fall. pMHx dementia, anemia, colon cancer, cataract, CKD.  Patient is a poor historian as she has dementia, history obtained per ED report, chart review, and son who is at bedside.  It is reported patient fell yesterday evening while walking with home health staff when her legs gave out underneath her, patient was assisted to the ground by staff.  This morning, patient was unable to stand and bear weight to LLE 2/2 pain.  Patient's son states at baseline patient has shuffling gait with small steps and ambulates with assistance.  In ED today, patient found to have left hip fracture.  CT pelvis impression with Acute subcapital left femoral neck fracture with left hip lipohemarthrosis.  L knee XR and L tib/fib XR impressions without acute abnormality.  ED discussed case with ortho, plan is for surgery in a.m. Denies blood thinner use.  Basic labs pending.  Admit to hospital medicine for further eval.      Overview/Hospital Course:  Patient closely monitored while hospitalized. She presented to the ED with difficulty walking and bearing weight after an assisted fall the day before. Patient underwent a CT pelvis and X-rays and was found to have Acute subcapital left femoral neck fracture with left hip lipohemarthrosis. L knee XR and L tib/fib XR impressions without acute abnormality. Ortho was consulted and planned for surgery the following day. Patient underwent left total hip arthroplasty with no complications per ortho OP note.     Interval History:   Patient s/p left total hip arthroplasty. Patient seen and examined at  bedside.  Pain well-controlled.  NAD. VSS. Minimal appetite. Afebrile and WBCs WNL. Patient hemoglobin 6.7. 1 unit of pRBCs ordered with tylenol and benadryl for history of transfusion reactions (fever and rash). Creatinine remains at 1.7. Will continue IVF. PT/OT consulted.   Review of Systems   Unable to perform ROS: Dementia     Objective:     Vital Signs (Most Recent):  Temp: 98.3 °F (36.8 °C) (05/26/25 1315)  Pulse: 87 (05/26/25 1315)  Resp: 17 (05/26/25 1315)  BP: 127/63 (05/26/25 1315)  SpO2: 96 % (05/26/25 1315) Vital Signs (24h Range):  Temp:  [97.6 °F (36.4 °C)-100.7 °F (38.2 °C)] 98.3 °F (36.8 °C)  Pulse:  [] 87  Resp:  [17-18] 17  SpO2:  [91 %-96 %] 96 %  BP: (125-164)/(63-87) 127/63     Weight: 42.6 kg (93 lb 14.7 oz)  Body mass index is 16.64 kg/m².    Intake/Output Summary (Last 24 hours) at 5/26/2025 1358  Last data filed at 5/26/2025 1245  Gross per 24 hour   Intake 1602.49 ml   Output 350 ml   Net 1252.49 ml         Physical Exam  Vitals reviewed.   Constitutional:       General: She is not in acute distress.  HENT:      Head: Normocephalic and atraumatic.      Mouth/Throat:      Mouth: Mucous membranes are moist.   Eyes:      Conjunctiva/sclera: Conjunctivae normal.   Cardiovascular:      Rate and Rhythm: Normal rate and regular rhythm.      Pulses:           Dorsalis pedis pulses are 2+ on the left side.   Pulmonary:      Effort: Pulmonary effort is normal. No respiratory distress.   Abdominal:      Palpations: Abdomen is soft.      Tenderness: There is no abdominal tenderness.   Musculoskeletal:      Cervical back: Normal range of motion.      Right lower leg: No edema.      Left lower leg: No edema.      Comments: ROM limited LLE    Skin:     General: Skin is warm and dry.      Coloration: Skin is pale.      Findings: Bruising present.   Neurological:      Mental Status: Mental status is at baseline.   Psychiatric:         Mood and Affect: Mood normal.               Significant Labs: All  pertinent labs within the past 24 hours have been reviewed.  CBC:   Recent Labs   Lab 05/24/25  1833 05/24/25  2339 05/25/25  1724 05/26/25  0502   WBC 6.53  --  8.75 7.30   HGB 7.8* 8.1* 7.4* 6.7*   HCT 25.8* 27.1* 24.2* 22.6*     --  178 176     CMP:   Recent Labs   Lab 05/24/25  1833 05/25/25  1724 05/26/25  0502    136 141   K 4.7 4.9 4.4   * 110 113*   CO2 19* 18* 21*   * 179* 118*   BUN 31* 29 28   CREATININE 1.6* 1.7* 1.7*   CALCIUM 8.3* 8.1* 8.0*   PROT 6.4 6.3 5.8*   ALBUMIN 3.4* 3.3* 3.1*   BILITOT 0.4 0.3 0.4   ALKPHOS 117 101 87   AST 11 12 10   ALT 10 9* 6*   ANIONGAP 7* 8 7*       Significant Imaging: I have reviewed all pertinent imaging results/findings within the past 24 hours.      Assessment & Plan  Femoral neck fracture  Monitor daily CBC  Pain control  Neurovascular checks  PT/OT  Fall precautions  Consult ortho  Status post femur repair  Stage 3b chronic kidney disease  Creatine stable for now. CMP reviewed- noted Estimated Creatinine Clearance: 14.5 mL/min (A) (based on SCr of 1.7 mg/dL (H)). according to latest data. Based on current GFR, CKD stage is stage 3 - GFR 30-59.  Monitor UOP and serial CMP and adjust therapy as needed. Renally dose meds. Avoid nephrotoxic medications and procedures.  Dementia  Patient with dementia with likely etiology of unknown dementia.  The patient does not have signs of behavioral disturbance. Continue non-pharmacologic interventions to prevent delirium (Activity during day, opening blinds, providing glasses/hearing aids, and up in chair during daytime). Will avoid narcotics and benzos unless absolutely necessary.   Anemia  Anemia etiology work up in progress, in with femur fracture. Most recent hemoglobin and hematocrit are listed below.  Recent Labs     05/24/25 2339 05/25/25  1724 05/26/25  0502   HGB 8.1* 7.4* 6.7*   HCT 27.1* 24.2* 22.6*     Plan  - Monitor serial CBC: Daily  - Transfuse PRBC if patient becomes hemodynamically  unstable, symptomatic or H/H drops below 7/21.  - Patient has not received any PRBC transfusions this admission  - Patient's anemia is currently worsening. Will Tranfuse 1 unit of PRBC  -Type and screen, retic count, iron studies, coags  -Occult stool negative   - Repeat h&h at 0000  -Measure thigh circumference  -Protonix  VTE Risk Mitigation (From admission, onward)           Ordered     Reason for No Pharmacological VTE Prophylaxis  Once        Question:  Reasons:  Answer:  Risk of Bleeding    05/24/25 1645     IP VTE HIGH RISK PATIENT  Once         05/24/25 1645     Place sequential compression device  Until discontinued         05/24/25 1645                    Discharge Planning   ENRRIQUE: 5/28/2025     Code Status: Full Code   Medical Readiness for Discharge Date:   Discharge Plan A: Skilled Nursing Facility   Discharge Delays: None known at this time            Please place Justification for DME        Lee Ortiz NP  Department of Hospital Medicine   [facility]

## 2025-05-26 NOTE — PT/OT/SLP PROGRESS
Physical Therapy      Patient Name:  Morena Mcclendon   MRN:  [id number]    Patient not seen today secondary to Blood transfusion. Will follow-up 5/27/2025.

## 2025-05-26 NOTE — PROGRESS NOTES
Pharmacist Intervention IV to PO Note    Morena Mcclendon is a 89 y.o. female being treated with IV medication pantoprazole    Patient Data:    Vital Signs (Most Recent):  Temp: 98.7 °F (37.1 °C) (05/26/25 1530)  Pulse: 79 (05/26/25 1530)  Resp: 18 (05/26/25 1530)  BP: 138/71 (05/26/25 1530)  SpO2: 96 % (05/26/25 1530) Vital Signs (72h Range):  Temp:  [97 °F (36.1 °C)-100.7 °F (38.2 °C)]   Pulse:  []   Resp:  [13-20]   BP: (120-192)/(55-89)   SpO2:  [91 %-99 %]      CBC:  Recent Labs   Lab 05/24/25  1833 05/24/25  2339 05/25/25  1724 05/26/25  0502   WBC 6.53  --  8.75 7.30   RBC 2.84*  --  2.65* 2.45*   HGB 7.8* 8.1* 7.4* 6.7*   HCT 25.8* 27.1* 24.2* 22.6*     --  178 176   MCV 91  --  91 92   MCH 27.5  --  27.9 27.3   MCHC 30.2*  --  30.6* 29.6*     CMP:     Recent Labs   Lab 05/24/25 1833 05/25/25  1724 05/26/25  0502   * 179* 118*   CALCIUM 8.3* 8.1* 8.0*   ALBUMIN 3.4* 3.3* 3.1*   PROT 6.4 6.3 5.8*    136 141   K 4.7 4.9 4.4   CO2 19* 18* 21*   * 110 113*   BUN 31* 29 28   CREATININE 1.6* 1.7* 1.7*   ALKPHOS 117 101 87   ALT 10 9* 6*   AST 11 12 10   BILITOT 0.4 0.3 0.4       Dietary Orders:  Diet Orders            Diet Adult Regular: Regular starting at 05/25 1107            Based on the following criteria, this patient qualifies for intravenous to oral conversion:  [x] The patients gastrointestinal tract is functioning (tolerating medications via oral or enteral route for 24 hours and tolerating food or enteral feeds for 24 hours).  [x] The patient is hemodynamically stable for 24 hours (heart rate <100 beats per minute, systolic blood pressure >99 mm Hg, and respiratory rate <20 breaths per minute).  [x] The patient shows clinical improvement (afebrile for at least 24 hours and white blood cell count downtrending or normalized). Additionally, the patient must be non-neutropenic (absolute neutrophil count >500 cells/mm3).  [x] For antimicrobials, the patient has received  IV therapy for at least 24 hours.    IV medication Pantoprazole 40 mg daily will be changed to oral medication Pantoprazole 40 mg daily before breakfast    Pharmacist's Name: Tristin Lopez  Pharmacist's Extension: [phone]

## 2025-05-26 NOTE — PLAN OF CARE
Met with Kevin, son, Hanna, daughter, and Rosalva, daughter-in-law at bedside to review discharge recommendation of SNF and they are agreeable to plan.    Patient/family provided with a list of facilities in-network with patient's payor plan. Providers that are owned, operated, or affiliated with [facility] are included on the list.     Notified that referrals will be sent to the below listed facilities from in-network list based on proximity to home/family support:   [facility]    Patient/family instructed to identify preference.    Preferred Facility:  [facility]    If an additional preferred facility not listed above is identified, additional referral to be sent. If above facilities unable to accept, will send additional referrals to in-network providers.      05/26/25 1140   Post-Acute Status   Post-Acute Authorization Placement   Post-Acute Placement Status Referrals Sent   Discharge Delays None known at this time   Discharge Plan   Discharge Plan A Skilled Nursing Facility   Discharge Plan B Skilled Nursing Facility

## 2025-05-26 NOTE — CONSULTS
[facility]  Wound Care    Patient Name:  Morena Mcclendon   MRN:  [id number]  Date: 5/26/2025  Diagnosis: Femoral neck fracture    History:     Past Medical History:   Diagnosis Date    Anemia     Cancer     colon, removed all but 12 in    Cataract     Dementia        Social History[1]    Precautions:     Allergies as of 05/24/2025    (No Known Allergies)       WO Assessment Details/Treatment     Wound care consulted for wounds present on admit.  Patient with skin tear to left elbow and MASD to inner buttock. Patient tolerated well. Plan of care formulated.       05/26/25 1456   WOCN Assessment   WOCN Total Time (mins) 20   Visit Date 05/26/25   Visit Time 1200   Consult Type New   WOCN Speciality Wound   Wound moisture   Intervention assessed;changed;applied;chart review;coordination of care;orders;team conference   Teaching on-going        Wound 05/24/25 2100 Skin Tear Left proximal Arm   Date First Assessed/Time First Assessed: 05/24/25 2100   Primary Wound Type: Skin Tear  Side: Left  Orientation: proximal  Location: Arm   Wound Image    Dressing Appearance Dried drainage   Drainage Amount Scant   Drainage Characteristics/Odor Serosanguineous   Appearance Pink;Maroon;Red   Care Cleansed with:;Antimicrobial agent;Soap and water;Wound cleanser;Applied:;Skin Barrier   Dressing Applied;Silver;Foam        Wound 05/24/25 Moisture associated dermatitis  medial Buttocks   Date First Assessed: 05/24/25   Present on Original Admission: Yes  Primary Wound Type: Moisture associated dermatitis  Side: (c)   Orientation: medial  Location: Buttocks   Wound Image                  [1]   Social History  Socioeconomic History    Marital status:    Tobacco Use    Smoking status: Never    Smokeless tobacco: Never   Substance and Sexual Activity    Alcohol use: Yes     Alcohol/week: 1.0 standard drink of alcohol     Types: 1 Glasses of wine per week     Comment: daily    Drug use: No    Sexual activity: Not  Currently     Social Drivers of Health     Financial Resource Strain: Low Risk  (3/16/2025)    [facility]    Overall Financial Resource Strain (CARDIA)     Difficulty of Paying Living Expenses: Not hard at all   Food Insecurity: No Food Insecurity (3/16/2025)    [facility]    Hunger Vital Sign     Worried About Running Out of Food in the Last Year: Never true     Ran Out of Food in the Last Year: Never true   Transportation Needs: No Transportation Needs (3/16/2025)    Received [facility]    PRAPARE - Transportation     Lack of Transportation (Medical): No     Lack of Transportation (Non-Medical): No   Physical Activity: Inactive (3/16/2025)    [facility]    Exercise Vital Sign     Days of Exercise per Week: 0 days     Minutes of Exercise per Session: 0 min   Stress: No Stress Concern Present (3/16/2025)    Received from [facility]    [place] [place] [employer] - Occupational Stress Questionnaire     Feeling of Stress : Only a little   Housing Stability: Low Risk  (3/16/2025)    [facility]    Housing Stability Vital Sign     Unable to Pay for Housing in the Last Year: No     Number of Times Moved in the Last Year: 0     Homeless in the Last Year: No

## 2025-05-26 NOTE — HOSPITAL COURSE
Patient closely monitored while hospitalized. She presented to the ED with difficulty walking and bearing weight after an assisted fall the day before. Patient underwent a CT pelvis and X-rays and was found to have Acute subcapital left femoral neck fracture with left hip lipohemarthrosis. L knee XR and L tib/fib XR impressions without acute abnormality. Ortho was consulted and planned for surgery the following day. Patient underwent left total hip arthroplasty with no complications per ortho OP note. Patient Hgb dropped to 6.7 post op day 2 and she received 1 unit PRBC with increase to 7.4. No signs of bleedign noted.  She was however found to be iron deficient and started on iron supplementation.  On admission patient had an ADRIANO which was treated with IV fluid repletion.  She was placed for strengthening at a SNF.

## 2025-05-26 NOTE — PROGRESS NOTES
Assessment/Plan:    Status Post left Total Hip Arthroplasty. Anemia post op  Pain controlled  WBAT  Post hip precuations  DVT ppx    Please text or call with any questions  Adan Matos MD  Orthopedic Surgeon  [facility]  [phone]       Continues current post-op course     LOS: 2 days     Subjective:  Post-Operative Day: 1 Status Post left Total Hip Arthroplasty  Systemic or Specific Complaints:No Complaints    Objective:  Vital signs in last 24 hours:  Temp:  [97.6 °F (36.4 °C)-100.7 °F (38.2 °C)] 98.3 °F (36.8 °C)  Pulse:  [] 87  Resp:  [17-18] 17  SpO2:  [91 %-96 %] 96 %  BP: (125-164)/(63-87) 127/63      General: alert, appears stated age, and cooperative   Wound: Wound clean and dry no evidence of infection.   Motion: Painless Range of Motion   DVT Exam: No evidence of DVT seen on physical exam.     Data Review  CBC:   Lab Results   Component Value Date    WBC 7.30 05/26/2025    RBC 2.45 (L) 05/26/2025    HGB 6.7 (LL) 05/26/2025    HCT 22.6 (L) 05/26/2025     05/26/2025

## 2025-05-26 NOTE — PLAN OF CARE
CHERYL spoke with patient's family at bedside regarding discharge plan. Patient's son, Kevin, reported they have private caretakers that assist with ADLs at the home. Patient's son reported they are open to SNF if recommended at discharge. He reported they would prefer [place] then [place]. SW informed patient's son that she would discuss the recommendations at discharge with family after therapy completes their evaluation.

## 2025-05-26 NOTE — SUBJECTIVE & OBJECTIVE
Interval History:   Patient s/p left total hip arthroplasty. Patient seen and examined at bedside.  Pain well-controlled.  NAD. VSS. Minimal appetite. Afebrile and WBCs WNL. Patient hemoglobin 6.7. 1 unit of pRBCs ordered with tylenol and benadryl for history of transfusion reactions (fever and rash). Creatinine remains at 1.7. Will continue IVF. PT/OT consulted.   Review of Systems   Unable to perform ROS: Dementia     Objective:     Vital Signs (Most Recent):  Temp: 98.3 °F (36.8 °C) (05/26/25 1315)  Pulse: 87 (05/26/25 1315)  Resp: 17 (05/26/25 1315)  BP: 127/63 (05/26/25 1315)  SpO2: 96 % (05/26/25 1315) Vital Signs (24h Range):  Temp:  [97.6 °F (36.4 °C)-100.7 °F (38.2 °C)] 98.3 °F (36.8 °C)  Pulse:  [] 87  Resp:  [17-18] 17  SpO2:  [91 %-96 %] 96 %  BP: (125-164)/(63-87) 127/63     Weight: 42.6 kg (93 lb 14.7 oz)  Body mass index is 16.64 kg/m².    Intake/Output Summary (Last 24 hours) at 5/26/2025 1358  Last data filed at 5/26/2025 1245  Gross per 24 hour   Intake 1602.49 ml   Output 350 ml   Net 1252.49 ml         Physical Exam  Vitals reviewed.   Constitutional:       General: She is not in acute distress.  HENT:      Head: Normocephalic and atraumatic.      Mouth/Throat:      Mouth: Mucous membranes are moist.   Eyes:      Conjunctiva/sclera: Conjunctivae normal.   Cardiovascular:      Rate and Rhythm: Normal rate and regular rhythm.      Pulses:           Dorsalis pedis pulses are 2+ on the left side.   Pulmonary:      Effort: Pulmonary effort is normal. No respiratory distress.   Abdominal:      Palpations: Abdomen is soft.      Tenderness: There is no abdominal tenderness.   Musculoskeletal:      Cervical back: Normal range of motion.      Right lower leg: No edema.      Left lower leg: No edema.      Comments: ROM limited LLE    Skin:     General: Skin is warm and dry.      Coloration: Skin is pale.      Findings: Bruising present.   Neurological:      Mental Status: Mental status is at baseline.    Psychiatric:         Mood and Affect: Mood normal.               Significant Labs: All pertinent labs within the past 24 hours have been reviewed.  CBC:   Recent Labs   Lab 05/24/25  1833 05/24/25  2339 05/25/25  1724 05/26/25  0502   WBC 6.53  --  8.75 7.30   HGB 7.8* 8.1* 7.4* 6.7*   HCT 25.8* 27.1* 24.2* 22.6*     --  178 176     CMP:   Recent Labs   Lab 05/24/25  1833 05/25/25  1724 05/26/25  0502    136 141   K 4.7 4.9 4.4   * 110 113*   CO2 19* 18* 21*   * 179* 118*   BUN 31* 29 28   CREATININE 1.6* 1.7* 1.7*   CALCIUM 8.3* 8.1* 8.0*   PROT 6.4 6.3 5.8*   ALBUMIN 3.4* 3.3* 3.1*   BILITOT 0.4 0.3 0.4   ALKPHOS 117 101 87   AST 11 12 10   ALT 10 9* 6*   ANIONGAP 7* 8 7*       Significant Imaging: I have reviewed all pertinent imaging results/findings within the past 24 hours.

## 2025-05-26 NOTE — ASSESSMENT & PLAN NOTE
Creatine stable for now. CMP reviewed- noted Estimated Creatinine Clearance: 14.5 mL/min (A) (based on SCr of 1.7 mg/dL (H)). according to latest data. Based on current GFR, CKD stage is stage 3 - GFR 30-59.  Monitor UOP and serial CMP and adjust therapy as needed. Renally dose meds. Avoid nephrotoxic medications and procedures.

## 2025-05-26 NOTE — ASSESSMENT & PLAN NOTE
Anemia etiology work up in progress, in with femur fracture. Most recent hemoglobin and hematocrit are listed below.  Recent Labs     05/24/25  2339 05/25/25  1724 05/26/25  0502   HGB 8.1* 7.4* 6.7*   HCT 27.1* 24.2* 22.6*     Plan  - Monitor serial CBC: Daily  - Transfuse PRBC if patient becomes hemodynamically unstable, symptomatic or H/H drops below 7/21.  - Patient has not received any PRBC transfusions this admission  - Patient's anemia is currently worsening. Will Tranfuse 1 unit of PRBC  -Type and screen, retic count, iron studies, coags  -Occult stool negative   - Repeat h&h at 0000  -Measure thigh circumference  -Protonix

## 2025-05-26 NOTE — CARE UPDATE
05/25/25 1915   Patient Assessment/Suction   Level of Consciousness (AVPU) alert   Respiratory Effort Normal;Unlabored   Expansion/Accessory Muscles/Retractions no use of accessory muscles;no retractions;expansion symmetric   All Lung Fields Breath Sounds diminished;clear   Rhythm/Pattern, Respiratory unlabored;pattern regular   Cough Frequency no cough   PRE-TX-O2   Device (Oxygen Therapy) room air   SpO2 96 %   Pulse Oximetry Type Intermittent   $ Pulse Oximetry - Multiple Charge Pulse Oximetry - Multiple   Pulse 74   Resp 18   Incentive Spirometer   $ Incentive Spirometer Charges unable to perform  (will not follow commands)   Education   $ Education Incentive Spirometry;15 min

## 2025-05-26 NOTE — CARE UPDATE
05/26/25 0655   Patient Assessment/Suction   Level of Consciousness (AVPU) alert   Respiratory Effort Normal;Unlabored   Expansion/Accessory Muscles/Retractions no use of accessory muscles;no retractions   All Lung Fields Breath Sounds Anterior:;Lateral:;diminished;clear   Rhythm/Pattern, Respiratory unlabored;pattern regular   Cough Frequency no cough   PRE-TX-O2   Device (Oxygen Therapy) room air   SpO2 (!) 94 %   Pulse Oximetry Type Intermittent   $ Pulse Oximetry - Multiple Charge Pulse Oximetry - Multiple   Pulse 103   Resp 18   Incentive Spirometer   $ Incentive Spirometer Charges unable to perform  (pt confused and unable to follow commands)   Administration (IS) unable to perform   Education   $ Education 15 min

## 2025-05-26 NOTE — PT/OT/SLP EVAL
Occupational Therapy   Evaluation    Name: Morena Mcclendon  MRN: [id number]  Admitting Diagnosis: Femoral neck fracture  Recent Surgery: Procedure(s) (LRB):  ARTHROPLASTY, HIP (Left) 1 Day Post-Op    Recommendations:     Discharge Recommendations: Moderate Intensity Therapy  Discharge Equipment Recommendations:   (TBD)  Barriers to discharge:   (increased physical assistance with ADLs and functional mobility.)    Assessment:     Morena Mcclendon is a 89 y.o. female with a medical diagnosis of Femoral neck fracture.  She presents with general weakness. Performance deficits affecting function: weakness, impaired endurance, impaired self care skills, impaired functional mobility, gait instability, impaired balance, decreased safety awareness, decreased lower extremity function, decreased upper extremity function.      Rehab Prognosis: Fair; patient would benefit from acute skilled OT services to address these deficits and reach maximum level of function.       Plan:     Patient to be seen 5 x/week to address the above listed problems via self-care/home management, therapeutic activities, therapeutic exercises  Plan of Care Expires: 06/26/25  Plan of Care Reviewed with: patient, family    Subjective     Chief Complaint: General weakness  Patient/Family Comments/goals: Improved functional mobility and ADL independence.     Occupational Profile:  Living Environment: lives with Son and DIL in 1 story ome. Has paid caregivers 7 days per week for 8 hours per day assistance with ADLs.   Previous level of function: requires assistance with ADLs, IADLs and transportation.  Roles and Routines: limited homemaker  Equipment Used at Home: shower chair, cane, straight, power chair  Assistance upon Discharge: family    Pain/Comfort:  Pain Rating 1: 0/10  Pain Rating Post-Intervention 1: 0/10    Patients cultural, spiritual, Worship conflicts given the current situation: no    Objective:     Communicated with: nurse prior to  session.  Patient found HOB elevated with telemetry, peripheral IV upon OT entry to room.    General Precautions: Standard, fall  Orthopedic Precautions: LLE weight bearing as tolerated, LLE posterior precautions  Braces:  (hip abduction pillow)  Respiratory Status: Room air    Occupational Performance:    Activities of Daily Living:  Grooming: minimum assistance to wash face bed level.    Cognitive/Visual Perceptual:  Cognitive/Psychosocial Skills:     -       Oriented to: Person and Place   -       Follows Commands/attention:Follows one-step commands  -       Communication: clear/fluent  -       Memory: Impaired STM and Poor immediate recall  -       Safety awareness/insight to disability: impaired   -       Mood/Affect/Coping skills/emotional control: Cooperative and Pleasant  Visual/Perceptual:      -Intact Acuity    Physical Exam:  Upper Extremity Range of Motion:     -       Right Upper Extremity: WFL  -       Left Upper Extremity: WFL  Upper Extremity Strength:    -       Right Upper Extremity: 3/5  -       Left Upper Extremity: 3/5   Strength:    -       Right Upper Extremity: fair  -       Left Upper Extremity: fair  Fine Motor Coordination:    -       Intact    AMPAC 6 Click ADL:  AMPAC Total Score: 13    Treatment & Education:  Patient and family educated about the purpose of Occupational Therapy and the importance of getting OOB.    Patient left HOB elevated with all lines intact, call button in reach, bed alarm on, and family present    GOALS:   Multidisciplinary Problems       Occupational Therapy Goals          Problem: Occupational Therapy    Goal Priority Disciplines Outcome Interventions   Occupational Therapy Goal     OT, PT/OT     Description: Goals to be met by: 6/26/2025     Patient will increase functional independence with ADLs by performing:    UE Dressing with Minimal Assistance.  Grooming while seated with Stand-by Assistance.  Toileting from bedside commode with Minimal Assistance for  hygiene and clothing management.   Sitting at edge of bed x10 minutes with Stand-by Assistance.  Supine to sit with Stand-by Assistance.  Toilet transfer to bedside commode with Stand-by Assistance.  Upper extremity exercise program x10 reps per handout, with assistance as needed.                         History:     Past Medical History:   Diagnosis Date    Anemia     Cancer     colon, removed all but 12 in    Cataract     Dementia          Past Surgical History:   Procedure Laterality Date    APPENDECTOMY      COLON SURGERY      EYE SURGERY      HERNIA REPAIR      HIP ARTHROPLASTY Left 5/25/2025    Procedure: ARTHROPLASTY, HIP;  Surgeon: Adan Matos MD;  Location: [facility];  Service: Orthopedics;  Laterality: Left;    HYSTERECTOMY      tumor removed  2014       Time Tracking:     OT Date of Treatment: 05/26/25  OT Start Time: 1022  OT Stop Time: 1036  OT Total Time (min): 14 min    Billable Minutes:Evaluation 14    5/26/2025

## 2025-05-27 PROBLEM — N17.9 ACUTE ON CHRONIC KIDNEY FAILURE: Status: ACTIVE | Noted: 2023-03-21

## 2025-05-27 PROBLEM — N18.9 ACUTE ON CHRONIC KIDNEY FAILURE: Status: ACTIVE | Noted: 2023-03-21

## 2025-05-27 LAB
ABO + RH BLD: NORMAL
ABSOLUTE EOSINOPHIL (SMH): 0.05 K/UL
ABSOLUTE EOSINOPHIL (SMH): 0.1 K/UL
ABSOLUTE MONOCYTE (SMH): 0.4 K/UL (ref 0.3–1)
ABSOLUTE MONOCYTE (SMH): 0.43 K/UL (ref 0.3–1)
ABSOLUTE NEUTROPHIL COUNT (SMH): 4.6 K/UL (ref 1.8–7.7)
ABSOLUTE NEUTROPHIL COUNT (SMH): 5.3 K/UL (ref 1.8–7.7)
ALBUMIN SERPL-MCNC: 3 G/DL (ref 3.5–5.2)
ALP SERPL-CCNC: 91 UNIT/L (ref 55–135)
ALT SERPL-CCNC: 3 UNIT/L (ref 10–44)
ANION GAP (SMH): 8 MMOL/L (ref 8–16)
AST SERPL-CCNC: 11 UNIT/L (ref 10–40)
BASOPHILS # BLD AUTO: 0 K/UL
BASOPHILS # BLD AUTO: 0.01 K/UL
BASOPHILS NFR BLD AUTO: 0 %
BASOPHILS NFR BLD AUTO: 0.2 %
BILIRUB SERPL-MCNC: 0.5 MG/DL (ref 0.1–1)
BLD PROD TYP BPU: NORMAL
BLOOD UNIT EXPIRATION DATE: NORMAL
BLOOD UNIT TYPE CODE: 5100
BUN SERPL-MCNC: 25 MG/DL (ref 10–30)
CALCIUM SERPL-MCNC: 7.4 MG/DL (ref 8.7–10.5)
CHLORIDE SERPL-SCNC: 115 MMOL/L (ref 95–110)
CO2 SERPL-SCNC: 18 MMOL/L (ref 23–29)
CREAT SERPL-MCNC: 1.6 MG/DL (ref 0.5–1.4)
CROSSMATCH INTERPRETATION: NORMAL
DISPENSE STATUS: NORMAL
ERYTHROCYTE [DISTWIDTH] IN BLOOD BY AUTOMATED COUNT: 15.8 % (ref 11.5–14.5)
ERYTHROCYTE [DISTWIDTH] IN BLOOD BY AUTOMATED COUNT: 15.8 % (ref 11.5–14.5)
GFR SERPLBLD CREATININE-BSD FMLA CKD-EPI: 30 ML/MIN/1.73/M2
GLUCOSE SERPL-MCNC: 100 MG/DL (ref 70–110)
HCT VFR BLD AUTO: 22.3 % (ref 37–48.5)
HCT VFR BLD AUTO: 23.9 % (ref 37–48.5)
HGB BLD-MCNC: 7 GM/DL (ref 12–16)
HGB BLD-MCNC: 7.4 GM/DL (ref 12–16)
IMM GRANULOCYTES # BLD AUTO: 0.05 K/UL (ref 0–0.04)
IMM GRANULOCYTES # BLD AUTO: 0.05 K/UL (ref 0–0.04)
IMM GRANULOCYTES NFR BLD AUTO: 0.8 % (ref 0–0.5)
IMM GRANULOCYTES NFR BLD AUTO: 0.9 % (ref 0–0.5)
LYMPHOCYTES # BLD AUTO: 0.29 K/UL (ref 1–4.8)
LYMPHOCYTES # BLD AUTO: 0.4 K/UL (ref 1–4.8)
MAGNESIUM SERPL-MCNC: 1.5 MG/DL (ref 1.6–2.6)
MCH RBC QN AUTO: 27.7 PG (ref 27–31)
MCH RBC QN AUTO: 28.1 PG (ref 27–31)
MCHC RBC AUTO-ENTMCNC: 31 G/DL (ref 32–36)
MCHC RBC AUTO-ENTMCNC: 31.4 G/DL (ref 32–36)
MCV RBC AUTO: 90 FL (ref 82–98)
MCV RBC AUTO: 90 FL (ref 82–98)
NUCLEATED RBC (/100WBC) (SMH): 0 /100 WBC
NUCLEATED RBC (/100WBC) (SMH): 0 /100 WBC
PLATELET # BLD AUTO: 147 K/UL (ref 150–450)
PLATELET # BLD AUTO: 161 K/UL (ref 150–450)
PMV BLD AUTO: 10.7 FL (ref 9.2–12.9)
PMV BLD AUTO: 11.4 FL (ref 9.2–12.9)
POTASSIUM SERPL-SCNC: 3.8 MMOL/L (ref 3.5–5.1)
PROT SERPL-MCNC: 5.5 GM/DL (ref 6–8.4)
RBC # BLD AUTO: 2.49 M/UL (ref 4–5.4)
RBC # BLD AUTO: 2.67 M/UL (ref 4–5.4)
RELATIVE EOSINOPHIL (SMH): 0.9 % (ref 0–8)
RELATIVE EOSINOPHIL (SMH): 1.6 % (ref 0–8)
RELATIVE LYMPHOCYTE (SMH): 5.4 % (ref 18–48)
RELATIVE LYMPHOCYTE (SMH): 6.4 % (ref 18–48)
RELATIVE MONOCYTE (SMH): 6.9 % (ref 4–15)
RELATIVE MONOCYTE (SMH): 7.4 % (ref 4–15)
RELATIVE NEUTROPHIL (SMH): 84.1 % (ref 38–73)
RELATIVE NEUTROPHIL (SMH): 85.4 % (ref 38–73)
SODIUM SERPL-SCNC: 141 MMOL/L (ref 136–145)
UNIT NUMBER: NORMAL
WBC # BLD AUTO: 5.39 K/UL (ref 3.9–12.7)
WBC # BLD AUTO: 6.25 K/UL (ref 3.9–12.7)

## 2025-05-27 PROCEDURE — 80053 COMPREHEN METABOLIC PANEL: CPT | Performed by: STUDENT IN AN ORGANIZED HEALTH CARE EDUCATION/TRAINING PROGRAM

## 2025-05-27 PROCEDURE — 25000003 PHARM REV CODE 250: Performed by: HOSPITALIST

## 2025-05-27 PROCEDURE — 30200315 PPD INTRADERMAL TEST REV CODE 302

## 2025-05-27 PROCEDURE — 25000003 PHARM REV CODE 250

## 2025-05-27 PROCEDURE — 36415 COLL VENOUS BLD VENIPUNCTURE: CPT

## 2025-05-27 PROCEDURE — 11000001 HC ACUTE MED/SURG PRIVATE ROOM

## 2025-05-27 PROCEDURE — 85025 COMPLETE CBC W/AUTO DIFF WBC: CPT | Performed by: STUDENT IN AN ORGANIZED HEALTH CARE EDUCATION/TRAINING PROGRAM

## 2025-05-27 PROCEDURE — 97110 THERAPEUTIC EXERCISES: CPT

## 2025-05-27 PROCEDURE — 25000003 PHARM REV CODE 250: Performed by: STUDENT IN AN ORGANIZED HEALTH CARE EDUCATION/TRAINING PROGRAM

## 2025-05-27 PROCEDURE — 97530 THERAPEUTIC ACTIVITIES: CPT

## 2025-05-27 PROCEDURE — 83735 ASSAY OF MAGNESIUM: CPT | Performed by: STUDENT IN AN ORGANIZED HEALTH CARE EDUCATION/TRAINING PROGRAM

## 2025-05-27 PROCEDURE — 86580 TB INTRADERMAL TEST: CPT

## 2025-05-27 PROCEDURE — P9016 RBC LEUKOCYTES REDUCED: HCPCS | Performed by: STUDENT IN AN ORGANIZED HEALTH CARE EDUCATION/TRAINING PROGRAM

## 2025-05-27 PROCEDURE — 63600175 PHARM REV CODE 636 W HCPCS

## 2025-05-27 PROCEDURE — 36415 COLL VENOUS BLD VENIPUNCTURE: CPT | Performed by: STUDENT IN AN ORGANIZED HEALTH CARE EDUCATION/TRAINING PROGRAM

## 2025-05-27 PROCEDURE — 99900031 HC PATIENT EDUCATION (STAT)

## 2025-05-27 PROCEDURE — 94760 N-INVAS EAR/PLS OXIMETRY 1: CPT

## 2025-05-27 PROCEDURE — 85025 COMPLETE CBC W/AUTO DIFF WBC: CPT

## 2025-05-27 PROCEDURE — 86920 COMPATIBILITY TEST SPIN: CPT | Performed by: STUDENT IN AN ORGANIZED HEALTH CARE EDUCATION/TRAINING PROGRAM

## 2025-05-27 PROCEDURE — 97162 PT EVAL MOD COMPLEX 30 MIN: CPT

## 2025-05-27 RX ORDER — HYDROCODONE BITARTRATE AND ACETAMINOPHEN 500; 5 MG/1; MG/1
TABLET ORAL
Status: DISCONTINUED | OUTPATIENT
Start: 2025-05-27 | End: 2025-05-28 | Stop reason: HOSPADM

## 2025-05-27 RX ORDER — MAGNESIUM SULFATE HEPTAHYDRATE 40 MG/ML
2 INJECTION, SOLUTION INTRAVENOUS ONCE
Status: COMPLETED | OUTPATIENT
Start: 2025-05-27 | End: 2025-05-27

## 2025-05-27 RX ADMIN — SODIUM CHLORIDE: 9 INJECTION, SOLUTION INTRAVENOUS at 11:05

## 2025-05-27 RX ADMIN — MUPIROCIN 1 G: 20 OINTMENT TOPICAL at 08:05

## 2025-05-27 RX ADMIN — Medication 1 TABLET: at 08:05

## 2025-05-27 RX ADMIN — SODIUM CHLORIDE: 9 INJECTION, SOLUTION INTRAVENOUS at 01:05

## 2025-05-27 RX ADMIN — MAGNESIUM SULFATE HEPTAHYDRATE 2 G: 40 INJECTION, SOLUTION INTRAVENOUS at 12:05

## 2025-05-27 RX ADMIN — THERA TABS 1 TABLET: TAB at 08:05

## 2025-05-27 RX ADMIN — TUBERCULIN PURIFIED PROTEIN DERIVATIVE 5 UNITS: 5 INJECTION, SOLUTION INTRADERMAL at 10:05

## 2025-05-27 RX ADMIN — PANTOPRAZOLE SODIUM 40 MG: 40 TABLET, DELAYED RELEASE ORAL at 05:05

## 2025-05-27 RX ADMIN — POLYETHYLENE GLYCOL 3350 17 G: 17 POWDER, FOR SOLUTION ORAL at 08:05

## 2025-05-27 NOTE — PLAN OF CARE
Problem: Occupational Therapy  Goal: Occupational Therapy Goal  Description: Goals to be met by: 6/26/2025     Patient will increase functional independence with ADLs by performing:    UE Dressing with Minimal Assistance.  Grooming while seated with Stand-by Assistance.  Toileting from bedside commode with Minimal Assistance for hygiene and clothing management.   Sitting at edge of bed x10 minutes with Stand-by Assistance.  Supine to sit with Stand-by Assistance.  Toilet transfer to bedside commode with Stand-by Assistance.  Upper extremity exercise program x10 reps per handout, with assistance as needed.    Outcome: Progressing

## 2025-05-27 NOTE — ASSESSMENT & PLAN NOTE
Monitor daily CBC  Pain control  Neurovascular checks  PT/OT  Fall precautions  Consult ortho  Status post femur repair  Case management following for SNF placement

## 2025-05-27 NOTE — PLAN OF CARE
Problem: Skin Injury Risk Increased  Goal: Skin Health and Integrity  Outcome: Progressing     Problem: Adult Inpatient Plan of Care  Goal: Plan of Care Review  Outcome: Progressing  Goal: Patient-Specific Goal (Individualized)  Outcome: Progressing  Goal: Absence of Hospital-Acquired Illness or Injury  Outcome: Progressing  Goal: Optimal Comfort and Wellbeing  Outcome: Progressing  Goal: Readiness for Transition of Care  Outcome: Progressing     Problem: Fall Injury Risk  Goal: Absence of Fall and Fall-Related Injury  Outcome: Progressing     Problem: Wound  Goal: Optimal Coping  Outcome: Progressing  Goal: Optimal Functional Ability  Outcome: Progressing  Goal: Absence of Infection Signs and Symptoms  Outcome: Progressing  Goal: Improved Oral Intake  Outcome: Progressing  Goal: Optimal Pain Control and Function  Outcome: Progressing  Goal: Skin Health and Integrity  Outcome: Progressing  Goal: Optimal Wound Healing  Outcome: Progressing     Problem: Acute Kidney Injury/Impairment  Goal: Fluid and Electrolyte Balance  Outcome: Progressing  Goal: Improved Oral Intake  Outcome: Progressing  Goal: Effective Renal Function  Outcome: Progressing

## 2025-05-27 NOTE — PLAN OF CARE
LOCET completed with Billie. CHERYL complete the PASRR and asked Beatriz to submit.  Awaiting form 142.     CHERYL sent the updated therapy notes and CBC results to Alberto to review for acceptance.

## 2025-05-27 NOTE — CARE UPDATE
05/27/25 0818   Patient Assessment/Suction   Level of Consciousness (AVPU) alert   Respiratory Effort Normal;Unlabored   Expansion/Accessory Muscles/Retractions no use of accessory muscles;no retractions   All Lung Fields Breath Sounds clear;diminished   Rhythm/Pattern, Respiratory unlabored   Cough Frequency no cough   PRE-TX-O2   Device (Oxygen Therapy) room air   SpO2 96 %   Pulse Oximetry Type Intermittent   $ Pulse Oximetry - Single Charge Pulse Oximetry - Single   Pulse 71   Resp 16   Education   $ Education 15 min;Other (see comment)  (pulse)

## 2025-05-27 NOTE — ASSESSMENT & PLAN NOTE
Anemia etiology work up in progress, in with femur fracture. Most recent hemoglobin and hematocrit are listed below.  Recent Labs     05/25/25  1724 05/26/25  0502 05/27/25  1123   HGB 7.4* 6.7* 7.4*   HCT 24.2* 22.6* 23.9*     Plan  - Monitor serial CBC: Daily  - Transfuse PRBC if patient becomes hemodynamically unstable, symptomatic or H/H drops below 7/21.  - Patient has not received any PRBC transfusions this admission  - Patient's anemia is currently improving  -Type and screen, retic count, iron studies, coags  -Occult stool negative   - H/H q4h  -Measure thigh circumference  -Monitor for signs of bleeding  -Protonix

## 2025-05-27 NOTE — MEDICAL/APP STUDENT
[facility]  Progress Note    Patient Name: Morena Mcclendon  MRN: [id number]  Patient Class: IP- Inpatient   Admission Date: 5/24/2025  Length of Stay: 3 days  Attending Physician: Gibran Hough MD  Primary Care Provider: Faith Nichole MD    Subjective:   Principal Problem:Femoral neck fracture           HPI:  89-year-old female presented to ED for eval s/p fall. pMHx dementia, anemia, colon cancer, cataract, CKD.  Patient is a poor historian as she has dementia, history obtained per ED report, chart review, and son who is at bedside.  It is reported patient fell yesterday evening while walking with home health staff when her legs gave out underneath her, patient was assisted to the ground by staff.  This morning, patient was unable to stand and bear weight to LLE 2/2 pain.  Patient's son states at baseline patient has shuffling gait with small steps and ambulates with assistance.  In ED today, patient found to have left hip fracture.  CT pelvis impression with Acute subcapital left femoral neck fracture with left hip lipohemarthrosis.  L knee XR and L tib/fib XR impressions without acute abnormality.  ED discussed case with ortho, plan is for surgery in a.m. Denies blood thinner use.  Basic labs pending.  Admit to hospital medicine for further eval.      Patient closely monitored while hospitalized. She presented to the ED with difficulty walking and bearing weight after an assisted fall the day before. Patient underwent a CT pelvis and X-rays and was found to have Acute subcapital left femoral neck fracture with left hip lipohemarthrosis. L knee XR and L tib/fib XR impressions without acute abnormality. Ortho was consulted and planned for surgery the following day. Patient underwent left total hip arthroplasty with no complications per ortho OP note. 05/26/2025 Patient Hgb at 6.7. 1 unit of pRBCs transfused.      Interval History:   Patient s/p left total hip arthroplasty. Patient seen and  examined at bedside.  Pain well-controlled.  NAD. VSS. Minimal appetite.  Creatinine with slight improvement to 1.6 from 1.7. Will continue IVF. PT/OT consulted.      Review of Systems   Unable to perform ROS: Dementia     Objective:     Physical Exam  Vitals reviewed.   Constitutional:       General: She is not in acute distress. Dementia  HENT:      Head: Normocephalic and atraumatic.      Mouth/Throat:      Mouth: Mucous membranes are moist.   Eyes:      Conjunctiva/sclera: Conjunctivae normal.   Cardiovascular:      Rate and Rhythm: Normal rate and regular rhythm.      Pulses:           Dorsalis pedis pulses are 2+ on the left side.   Pulmonary:      Effort: Pulmonary effort is normal. No respiratory distress.   Abdominal:      Palpations: Abdomen is soft.      Tenderness: There is no abdominal tenderness.   Musculoskeletal:      Cervical back: Normal range of motion.      Right lower leg: No edema.      Left lower leg: No edema.      Comments: ROM limited LLE    Skin:     General: Skin is warm and dry.      Coloration: Skin is pale.      Findings: Bruising present.   Neurological:      Mental Status: Mental status is at baseline.   Psychiatric:         Mood and Affect: Mood normal.         Assessment/Plan:      Femoral neck fracture  Monitor daily CBC  Pain control  Neurovascular checks  PT/OT  Fall precautions  Consult ortho  Status post left total hip arthroplasty        Stage 3b chronic kidney disease  Creatine stable for now. CMP reviewed- noted Estimated Creatinine Clearance: 14.5 mL/min (A) (based on SCr of 1.6 mg/dL (H)). according to latest data. Based on current GFR, CKD stage is stage 3 - GFR 30-59.  Monitor UOP and serial CMP and adjust therapy as needed. Renally dose meds. Avoid nephrotoxic medications and procedures.      Dementia  Patient with dementia with likely etiology of unknown dementia.  The patient does not have signs of behavioral disturbance. Continue non-pharmacologic interventions to  prevent delirium (Activity during day, opening blinds, providing glasses/hearing aids, and up in chair during daytime). Will avoid narcotics and benzos unless absolutely necessary.       Anemia  Anemia etiology work up in progress, in with femur fracture. Most recent hemoglobin and hematocrit are listed below.        Recent Labs     05/24/25  2339 05/25/25  1724 05/26/25  0502   HGB 8.1* 7.4* 6.7*   HCT 27.1* 24.2* 22.6*      Plan  - Monitor serial CBC: Daily  - Transfuse PRBC if patient becomes hemodynamically unstable, symptomatic or H/H drops below 7/21.  - Patient's anemia is currently worsening. Will Tranfuse 1 unit of PRBC  -Type and screen, retic count, iron studies, coags  -Occult stool negative   - Repeat h&h at 0000  -Measure thigh circumference  -Protonix      Active Diagnoses:    Diagnosis Date Noted POA    PRINCIPAL PROBLEM:  Femoral neck fracture [S72.009A] 05/24/2025 Yes    Dementia [F03.90] 05/24/2025 Yes    Anemia [D64.9] 05/24/2025 Yes    Stage 3b chronic kidney disease [N18.32] 03/21/2023 Yes      Problems Resolved During this Admission:     VTE Risk Mitigation (From admission, onward)           Ordered     Reason for No Pharmacological VTE Prophylaxis  Once        Question:  Reasons:  Answer:  Risk of Bleeding    05/24/25 1645     IP VTE HIGH RISK PATIENT  Once         05/24/25 1645     Place sequential compression device  Until discontinued         05/24/25 1645                       MAKSIM Morley  [facility]

## 2025-05-27 NOTE — SUBJECTIVE & OBJECTIVE
Interval History:   Patient seen and examined at bedside.  Pain well-controlled.  NAD. VSS. Minimal appetite.  Creatinine with slight improvement to 1.6 from 1.7. Will continue IVF. PT/OT consulted.   Review of Systems   Unable to perform ROS: Dementia     Objective:     Vital Signs (Most Recent):  Temp: 98 °F (36.7 °C) (05/27/25 1125)  Pulse: 92 (05/27/25 1125)  Resp: 16 (05/27/25 0818)  BP: 120/66 (05/27/25 1125)  SpO2: 96 % (05/27/25 1125) Vital Signs (24h Range):  Temp:  [97.6 °F (36.4 °C)-98.7 °F (37.1 °C)] 98 °F (36.7 °C)  Pulse:  [] 92  Resp:  [16-18] 16  SpO2:  [94 %-98 %] 96 %  BP: (120-179)/(63-90) 120/66     Weight: 42.6 kg (93 lb 14.7 oz)  Body mass index is 16.64 kg/m².    Intake/Output Summary (Last 24 hours) at 5/27/2025 1149  Last data filed at 5/27/2025 0631  Gross per 24 hour   Intake 2027.5 ml   Output --   Net 2027.5 ml         Physical Exam  Vitals reviewed.   Constitutional:       General: She is not in acute distress.  HENT:      Head: Normocephalic and atraumatic.      Mouth/Throat:      Mouth: Mucous membranes are moist.   Eyes:      Conjunctiva/sclera: Conjunctivae normal.   Cardiovascular:      Rate and Rhythm: Normal rate and regular rhythm.      Pulses:           Dorsalis pedis pulses are 2+ on the left side.   Pulmonary:      Effort: Pulmonary effort is normal. No respiratory distress.   Abdominal:      Palpations: Abdomen is soft.      Tenderness: There is no abdominal tenderness.   Musculoskeletal:      Cervical back: Normal range of motion.      Right lower leg: No edema.      Left lower leg: No edema.      Comments: ROM limited LLE    Skin:     General: Skin is warm and dry.      Coloration: Skin is pale.      Findings: Bruising present.   Neurological:      Mental Status: Mental status is at baseline.   Psychiatric:         Mood and Affect: Mood normal.               Significant Labs: All pertinent labs within the past 24 hours have been reviewed.  CBC:   Recent Labs   Lab  05/25/25  1724 05/26/25  0502 05/27/25  1123   WBC 8.75 7.30 5.39   HGB 7.4* 6.7* 7.4*   HCT 24.2* 22.6* 23.9*    176 147*     CMP:   Recent Labs   Lab 05/25/25  1724 05/26/25  0502 05/27/25  0523    141 141   K 4.9 4.4 3.8    113* 115*   CO2 18* 21* 18*   * 118* 100   BUN 29 28 25   CREATININE 1.7* 1.7* 1.6*   CALCIUM 8.1* 8.0* 7.4*   PROT 6.3 5.8* 5.5*   ALBUMIN 3.3* 3.1* 3.0*   BILITOT 0.3 0.4 0.5   ALKPHOS 101 87 91   AST 12 10 11   ALT 9* 6* 3*   ANIONGAP 8 7* 8     Magnesium:   Recent Labs   Lab 05/25/25  1724 05/26/25  0502 05/27/25  0523   MG 1.7 1.6 1.5*       Significant Imaging: I have reviewed all pertinent imaging results/findings within the past 24 hours.

## 2025-05-27 NOTE — PT/OT/SLP EVAL
Physical Therapy Evaluation    Patient Name:  Morena Mcclendon   MRN:  [id number]    Recommendations:     Discharge Recommendations: Moderate Intensity Therapy   Discharge Equipment Recommendations: to be determined by next level of care   Barriers to discharge: Increased caregiver burden of care    Assessment:     Morena Mcclendon is a 89 y.o. female admitted with a medical diagnosis of Femoral neck fracture.PMHx dementia, anemia, colon cancer, cataract, CKD. Patient is a poor historian as she has dementia.  She presents with the following impairments/functional limitations: weakness, impaired endurance, impaired self care skills, decreased lower extremity function, decreased upper extremity function, decreased safety awareness, pain, gait instability, impaired functional mobility, impaired cognition, impaired cardiopulmonary response to activity, impaired skin .    Rehab Prognosis: Fair; patient would benefit from acute skilled PT services to address these deficits and reach maximum level of function.    Recent Surgery: Procedure(s) (LRB):  ARTHROPLASTY, HIP (Left) 2 Days Post-Op    Plan:     During this hospitalization, patient to be seen 6 x/week to address the identified rehab impairments via gait training, therapeutic activities, therapeutic exercises and progress toward the following goals:    Plan of Care Expires:   6/27/2025    Subjective     Chief Complaint: L hip pain with movement  Patient/Family Comments/goals: Did not state  Pain/Comfort:  Pain Rating 1:  (.  Initially reported no pain while in supine but a few minutes later while still  in supine 10/10  , Questionable quantifier of pain level.)  Location - Side 1: Left  Location 1: hip  Pain Addressed 1: Nurse notified, Reposition, Distraction    Patients cultural, spiritual, Mu-ism conflicts given the current situation:      Living Environment:  Pt lives at home with her son and daughter-in-law in a [facility] with 3 step entry B railing.  Prior to  admission, patient required assistance with functional mobility and ADL's . She reportedly ambulates short distance with HHA with  slow waddling gait.  Equipment used at home: shower chair, power chair, cane, straight.  DME owned (not currently used): none.  Upon discharge, patient will have assistance from facility.    Objective:     Communicated with nurse prior to session.  Patient found supine with telemetry, peripheral IV, bed alarm  upon PT entry to room.    General Precautions: Standard, fall  Orthopedic Precautions:LLE weight bearing as tolerated, LLE posterior precautions   Braces:    Respiratory Status: Room air    Exams:  Cognitive Exam:  Patient is oriented to Person, Place, and Situation  RLE ROM: WFL  RLE Strength: WFL  LLE ROM: impaired due to recent surgery  LLE Strength: 3/5    Functional Mobility:  Bed Mobility:     Supine to Sit: maximal assistance and of 2 persons  Sit to Supine: maximal assistance and of 2 persons  Transfers:     Sit to Stand:  maximal assistance and of 2 persons with hand-held assist for a few seconds. No indication of increased pain  Balance: unsupported sitting balance. Remained at EOB for 30 minutes for self feeding      AM-PAC 6 CLICK MOBILITY  Total Score:9       Treatment & Education:  Pt was educated on safety, use of call light, PT POC/DC recommendation    Patient left supine with all lines intact, call button in reach, bed alarm on, and daughter present.    GOALS:   Multidisciplinary Problems       Physical Therapy Goals          Problem: Physical Therapy    Goal Priority Disciplines Outcome Interventions   Physical Therapy Goal     PT, PT/OT     Description: Goals to be met by: 2025     Patient will increase functional independence with mobility by performin. Supine to sit with Moderate Assistance  2. Sit to supine with Moderate Assistance  3. Sit to stand transfer with Moderate Assistance  4. Bed to chair transfer with Moderate Assistance using Rolling  Walker  5. Gait  x 10  feet with Minimal Assistance x2 using Rolling Walker.                          DME Justifications:  No DME recommended requiring DME justifications    History:     Past Medical History:   Diagnosis Date    Anemia     Cancer     colon, removed all but 12 in    Cataract     Dementia        Past Surgical History:   Procedure Laterality Date    APPENDECTOMY      COLON SURGERY      EYE SURGERY      HERNIA REPAIR      HIP ARTHROPLASTY Left 5/25/2025    Procedure: ARTHROPLASTY, HIP;  Surgeon: Adan Matos MD;  Location: [facility];  Service: Orthopedics;  Laterality: Left;    HYSTERECTOMY      tumor removed  2014       Time Tracking:     PT Received On: 05/27/25  PT Start Time: 1330     PT Stop Time: 1420  PT Total Time (min): 50 min     Billable Minutes: Evaluation 10 minutes  and Therapeutic Activity 40 minutes      05/27/2025

## 2025-05-27 NOTE — PROGRESS NOTES
[facility]  Progress Note    Patient Name: Morena Mcclendon  MRN: [id number]  Patient Class: IP- Inpatient   Admission Date: 5/24/2025  Length of Stay: 3 days  Attending Physician: Gibran Hough MD  Primary Care Provider: Faith Nichole MD        Subjective     Principal Problem:Femoral neck fracture        HPI:  89-year-old female presented to ED for eval s/p fall. pMHx dementia, anemia, colon cancer, cataract, CKD.  Patient is a poor historian as she has dementia, history obtained per ED report, chart review, and son who is at bedside.  It is reported patient fell yesterday evening while walking with home health staff when her legs gave out underneath her, patient was assisted to the ground by staff.  This morning, patient was unable to stand and bear weight to LLE 2/2 pain.  Patient's son states at baseline patient has shuffling gait with small steps and ambulates with assistance.  In ED today, patient found to have left hip fracture.  CT pelvis impression with Acute subcapital left femoral neck fracture with left hip lipohemarthrosis.  L knee XR and L tib/fib XR impressions without acute abnormality.  ED discussed case with ortho, plan is for surgery in a.m. Denies blood thinner use.  Basic labs pending.  Admit to hospital medicine for further eval.      Overview/Hospital Course:  Patient closely monitored while hospitalized. She presented to the ED with difficulty walking and bearing weight after an assisted fall the day before. Patient underwent a CT pelvis and X-rays and was found to have Acute subcapital left femoral neck fracture with left hip lipohemarthrosis. L knee XR and L tib/fib XR impressions without acute abnormality. Ortho was consulted and planned for surgery the following day. Patient underwent left total hip arthroplasty with no complications per ortho OP note. Patient Hgb dropped to 6.7 post op day 2 and she received 1 unit PRBC with increase to 7.4. No  signs of bleedign noted. On admission patient had an ADRIANO and was started on IVF.     Interval History:   Patient seen and examined at bedside.  Pain well-controlled.  NAD. VSS. Minimal appetite.  Creatinine with slight improvement to 1.6 from 1.7. Will continue IVF. PT/OT consulted.   Review of Systems   Unable to perform ROS: Dementia     Objective:     Vital Signs (Most Recent):  Temp: 98 °F (36.7 °C) (05/27/25 1125)  Pulse: 92 (05/27/25 1125)  Resp: 16 (05/27/25 0818)  BP: 120/66 (05/27/25 1125)  SpO2: 96 % (05/27/25 1125) Vital Signs (24h Range):  Temp:  [97.6 °F (36.4 °C)-98.7 °F (37.1 °C)] 98 °F (36.7 °C)  Pulse:  [] 92  Resp:  [16-18] 16  SpO2:  [94 %-98 %] 96 %  BP: (120-179)/(63-90) 120/66     Weight: 42.6 kg (93 lb 14.7 oz)  Body mass index is 16.64 kg/m².    Intake/Output Summary (Last 24 hours) at 5/27/2025 1149  Last data filed at 5/27/2025 0631  Gross per 24 hour   Intake 2027.5 ml   Output --   Net 2027.5 ml         Physical Exam  Vitals reviewed.   Constitutional:       General: She is not in acute distress.  HENT:      Head: Normocephalic and atraumatic.      Mouth/Throat:      Mouth: Mucous membranes are moist.   Eyes:      Conjunctiva/sclera: Conjunctivae normal.   Cardiovascular:      Rate and Rhythm: Normal rate and regular rhythm.      Pulses:           Dorsalis pedis pulses are 2+ on the left side.   Pulmonary:      Effort: Pulmonary effort is normal. No respiratory distress.   Abdominal:      Palpations: Abdomen is soft.      Tenderness: There is no abdominal tenderness.   Musculoskeletal:      Cervical back: Normal range of motion.      Right lower leg: No edema.      Left lower leg: No edema.      Comments: ROM limited LLE    Skin:     General: Skin is warm and dry.      Coloration: Skin is pale.      Findings: Bruising present.   Neurological:      Mental Status: Mental status is at baseline.   Psychiatric:         Mood and Affect: Mood normal.               Significant Labs: All  pertinent labs within the past 24 hours have been reviewed.  CBC:   Recent Labs   Lab 05/25/25  1724 05/26/25  0502 05/27/25  1123   WBC 8.75 7.30 5.39   HGB 7.4* 6.7* 7.4*   HCT 24.2* 22.6* 23.9*    176 147*     CMP:   Recent Labs   Lab 05/25/25  1724 05/26/25  0502 05/27/25  0523    141 141   K 4.9 4.4 3.8    113* 115*   CO2 18* 21* 18*   * 118* 100   BUN 29 28 25   CREATININE 1.7* 1.7* 1.6*   CALCIUM 8.1* 8.0* 7.4*   PROT 6.3 5.8* 5.5*   ALBUMIN 3.3* 3.1* 3.0*   BILITOT 0.3 0.4 0.5   ALKPHOS 101 87 91   AST 12 10 11   ALT 9* 6* 3*   ANIONGAP 8 7* 8     Magnesium:   Recent Labs   Lab 05/25/25  1724 05/26/25  0502 05/27/25  0523   MG 1.7 1.6 1.5*       Significant Imaging: I have reviewed all pertinent imaging results/findings within the past 24 hours.      Assessment & Plan  Femoral neck fracture  Monitor daily CBC  Pain control  Neurovascular checks  PT/OT  Fall precautions  Consult ortho  Status post femur repair  Case management following for SNF placement  Acute on chronic kidney failure  -Renally dose meds. Avoid nephrotoxic medications and procedures.  needed. Avoid nephrotoxins and renally dose meds for GFR listed above.   -IVF  -Patient with acute kidney injury likely d/t IVVD/Dehydration  caused by acute blood loss from surgery and decreased oral intake. ADRIANO is currently stable. Labs reviewed- Renal function/electrolytes with Estimated Creatinine Clearance: 15.4 mL/min (A) (based on SCr of 1.6 mg/dL (H)). according to latest data. Monitor urine output and serial BMP and adjust therapy as needed. Avoid nephrotoxins and renally dose meds for GFR listed above.     Dementia  Patient with dementia with likely etiology of unknown dementia.  The patient does not have signs of behavioral disturbance. Continue non-pharmacologic interventions to prevent delirium (Activity during day, opening blinds, providing glasses/hearing aids, and up in chair during daytime). Will avoid narcotics and  benzos unless absolutely necessary.   Anemia  Anemia etiology work up in progress, in with femur fracture. Most recent hemoglobin and hematocrit are listed below.  Recent Labs     05/25/25  1724 05/26/25  0502 05/27/25  1123   HGB 7.4* 6.7* 7.4*   HCT 24.2* 22.6* 23.9*     Plan  - Monitor serial CBC: Daily  - Transfuse PRBC if patient becomes hemodynamically unstable, symptomatic or H/H drops below 7/21.  - Patient has not received any PRBC transfusions this admission  - Patient's anemia is currently improving  -Type and screen, retic count, iron studies, coags  -Occult stool negative   - H/H q4h  -Measure thigh circumference  -Monitor for signs of bleeding  -Protonix  VTE Risk Mitigation (From admission, onward)           Ordered     Reason for No Pharmacological VTE Prophylaxis  Once        Question:  Reasons:  Answer:  Risk of Bleeding    05/24/25 1645     IP VTE HIGH RISK PATIENT  Once         05/24/25 1645     Place sequential compression device  Until discontinued         05/24/25 1645                    Discharge Planning   ENRRIQUE: 5/28/2025     Code Status: Full Code   Medical Readiness for Discharge Date:   Discharge Plan A: Skilled Nursing Facility   Discharge Delays: None known at this time            Please place Justification for DME        Lee Ortiz NP  Department of Hospital Medicine   [facility]

## 2025-05-27 NOTE — CARE UPDATE
05/26/25 1900   Patient Assessment/Suction   Level of Consciousness (AVPU) alert   Respiratory Effort Normal;Unlabored   Expansion/Accessory Muscles/Retractions no retractions;no use of accessory muscles;expansion symmetric   Rhythm/Pattern, Respiratory no shortness of breath reported;depth regular;pattern regular;unlabored   Cough Frequency no cough   PRE-TX-O2   Device (Oxygen Therapy) room air   SpO2 96 %   Pulse Oximetry Type Intermittent   $ Pulse Oximetry - Multiple Charge Pulse Oximetry - Multiple   Pulse 88   Resp 18   Temp 98 °F (36.7 °C)   BP (!) 146/82   Education   $ Education 15 min   Respiratory Evaluation   $ Care Plan Tech Time 15 min

## 2025-05-27 NOTE — PLAN OF CARE
SW met with patient's son, Kevin, and daughter-in-law, Rosalva, at bedside regarding the patient's social security number. Kevin reported he does not have it on him, but would go to the home and get it for SW. SW updated Kevin about the facilities that are considering placement, but needed clinical review and updated therapy notes and labs.

## 2025-05-27 NOTE — ASSESSMENT & PLAN NOTE
-Renally dose meds. Avoid nephrotoxic medications and procedures.  needed. Avoid nephrotoxins and renally dose meds for GFR listed above.   -IVF  -Patient with acute kidney injury likely d/t IVVD/Dehydration  caused by acute blood loss from surgery and decreased oral intake. ADRIANO is currently stable. Labs reviewed- Renal function/electrolytes with Estimated Creatinine Clearance: 15.4 mL/min (A) (based on SCr of 1.6 mg/dL (H)). according to latest data. Monitor urine output and serial BMP and adjust therapy as needed. Avoid nephrotoxins and renally dose meds for GFR listed above.

## 2025-05-27 NOTE — PT/OT/SLP PROGRESS
Occupational Therapy   Treatment    Name: Morena Mcclendon  MRN: [id number]  Admitting Diagnosis:  Femoral neck fracture  2 Days Post-Op    Recommendations:     Discharge Recommendations: Moderate Intensity Therapy  Discharge Equipment Recommendations:   (TBD)  Barriers to discharge:   (increased physical assistance with ADLs and functional mobility.)    Assessment:     Morena Mcclendon is a 89 y.o. female with a medical diagnosis of Femoral neck fracture.  She presents with general weakness. Patient participated in BUE/BLE therex bed level, bed mobility, unsupported sitting EOB and sit to stand x2 trials using rolling walker. Performance deficits affecting function are weakness, impaired endurance, impaired self care skills, impaired functional mobility, gait instability, impaired balance, decreased safety awareness, decreased lower extremity function, decreased upper extremity function.     Rehab Prognosis:  Fair; patient would benefit from acute skilled OT services to address these deficits and reach maximum level of function.       Plan:     Patient to be seen 5 x/week to address the above listed problems via self-care/home management, therapeutic activities, therapeutic exercises  Plan of Care Expires: 06/26/25  Plan of Care Reviewed with: patient, family    Subjective     Chief Complaint: General weakness  Patient/Family Comments/goals: Improved functional mobility and ADL independence.   Pain/Comfort:  Pain Rating 1: 0/10  Pain Rating Post-Intervention 1: 0/10    Objective:     Communicated with: nurse prior to session.  Patient found HOB elevated with telemetry, peripheral IV upon OT entry to room.    General Precautions: Standard, fall    Orthopedic Precautions:LLE weight bearing as tolerated, LLE posterior precautions  Braces:  (hip abduction pillow)  Respiratory Status: Room air     Occupational Performance:     Bed Mobility:    Patient completed Scooting/Bridging with maximal assistance  Patient  completed Supine to Sit with maximal assistance  Patient completed Sit to Supine with maximal assistance   Performed unsupported sitting EOB with contact guard assistance.     Functional Mobility/Transfers:  Patient completed Sit <> Stand x2 Trials with moderate assistance using hand-held assist and rolling walker     BUE/BLE Therex:  Performed BUE/BLE therex x10 reps with minimal assistance bed level.       [facility] 6 Click ADL: 14    Treatment & Education:  Made a goal to get patient into a chair in future session.     Patient left HOB elevated with all lines intact, call button in reach, bed alarm on, and family present    GOALS:   Multidisciplinary Problems       Occupational Therapy Goals          Problem: Occupational Therapy    Goal Priority Disciplines Outcome Interventions   Occupational Therapy Goal     OT, PT/OT Progressing    Description: Goals to be met by: 6/26/2025     Patient will increase functional independence with ADLs by performing:    UE Dressing with Minimal Assistance.  Grooming while seated with Stand-by Assistance.  Toileting from bedside commode with Minimal Assistance for hygiene and clothing management.   Sitting at edge of bed x10 minutes with Stand-by Assistance.  Supine to sit with Stand-by Assistance.  Toilet transfer to bedside commode with Stand-by Assistance.  Upper extremity exercise program x10 reps per handout, with assistance as needed.                         Time Tracking:     OT Date of Treatment: 05/27/25  OT Start Time: 1031  OT Stop Time: 1054  OT Total Time (min): 23 min    Billable Minutes:Therapeutic Activity 12  Therapeutic Exercise 11    OT/ELSA: OT          5/27/2025

## 2025-05-28 VITALS
DIASTOLIC BLOOD PRESSURE: 76 MMHG | HEIGHT: 63 IN | BODY MASS INDEX: 16.64 KG/M2 | RESPIRATION RATE: 18 BRPM | WEIGHT: 93.94 LBS | SYSTOLIC BLOOD PRESSURE: 182 MMHG | HEART RATE: 67 BPM | OXYGEN SATURATION: 98 % | TEMPERATURE: 98 F

## 2025-05-28 PROBLEM — N17.9 ACUTE ON CHRONIC KIDNEY FAILURE: Status: RESOLVED | Noted: 2023-03-21 | Resolved: 2025-05-28

## 2025-05-28 PROBLEM — N18.9 ACUTE ON CHRONIC KIDNEY FAILURE: Status: RESOLVED | Noted: 2023-03-21 | Resolved: 2025-05-28

## 2025-05-28 LAB
ABSOLUTE EOSINOPHIL (SMH): 0.15 K/UL
ABSOLUTE MONOCYTE (SMH): 0.5 K/UL (ref 0.3–1)
ABSOLUTE NEUTROPHIL COUNT (SMH): 5.2 K/UL (ref 1.8–7.7)
ALBUMIN SERPL-MCNC: 2.7 G/DL (ref 3.5–5.2)
ALP SERPL-CCNC: 110 UNIT/L (ref 55–135)
ALT SERPL-CCNC: 5 UNIT/L (ref 10–44)
ANION GAP (SMH): 6 MMOL/L (ref 8–16)
AST SERPL-CCNC: 14 UNIT/L (ref 10–40)
BASOPHILS # BLD AUTO: 0.01 K/UL
BASOPHILS NFR BLD AUTO: 0.2 %
BILIRUB SERPL-MCNC: 0.6 MG/DL (ref 0.1–1)
BUN SERPL-MCNC: 25 MG/DL (ref 10–30)
CALCIUM SERPL-MCNC: 7.4 MG/DL (ref 8.7–10.5)
CHLORIDE SERPL-SCNC: 116 MMOL/L (ref 95–110)
CO2 SERPL-SCNC: 20 MMOL/L (ref 23–29)
CREAT SERPL-MCNC: 1.6 MG/DL (ref 0.5–1.4)
ERYTHROCYTE [DISTWIDTH] IN BLOOD BY AUTOMATED COUNT: 16.1 % (ref 11.5–14.5)
GFR SERPLBLD CREATININE-BSD FMLA CKD-EPI: 30 ML/MIN/1.73/M2
GLUCOSE SERPL-MCNC: 115 MG/DL (ref 70–110)
HCT VFR BLD AUTO: 30.1 % (ref 37–48.5)
HGB BLD-MCNC: 9.7 GM/DL (ref 12–16)
IMM GRANULOCYTES # BLD AUTO: 0.05 K/UL (ref 0–0.04)
IMM GRANULOCYTES NFR BLD AUTO: 0.8 % (ref 0–0.5)
LYMPHOCYTES # BLD AUTO: 0.37 K/UL (ref 1–4.8)
MAGNESIUM SERPL-MCNC: 2.1 MG/DL (ref 1.6–2.6)
MCH RBC QN AUTO: 27.9 PG (ref 27–31)
MCHC RBC AUTO-ENTMCNC: 32.2 G/DL (ref 32–36)
MCV RBC AUTO: 87 FL (ref 82–98)
NUCLEATED RBC (/100WBC) (SMH): 0 /100 WBC
OHS QRS DURATION: 72 MS
OHS QTC CALCULATION: 431 MS
PLATELET # BLD AUTO: 163 K/UL (ref 150–450)
PMV BLD AUTO: 10.8 FL (ref 9.2–12.9)
POTASSIUM SERPL-SCNC: 3.6 MMOL/L (ref 3.5–5.1)
PROT SERPL-MCNC: 5.3 GM/DL (ref 6–8.4)
RBC # BLD AUTO: 3.48 M/UL (ref 4–5.4)
RELATIVE EOSINOPHIL (SMH): 2.4 % (ref 0–8)
RELATIVE LYMPHOCYTE (SMH): 5.9 % (ref 18–48)
RELATIVE MONOCYTE (SMH): 7.9 % (ref 4–15)
RELATIVE NEUTROPHIL (SMH): 82.8 % (ref 38–73)
SODIUM SERPL-SCNC: 142 MMOL/L (ref 136–145)
WBC # BLD AUTO: 6.3 K/UL (ref 3.9–12.7)

## 2025-05-28 PROCEDURE — 97530 THERAPEUTIC ACTIVITIES: CPT

## 2025-05-28 PROCEDURE — 25000003 PHARM REV CODE 250: Performed by: STUDENT IN AN ORGANIZED HEALTH CARE EDUCATION/TRAINING PROGRAM

## 2025-05-28 PROCEDURE — 25000003 PHARM REV CODE 250

## 2025-05-28 PROCEDURE — 97535 SELF CARE MNGMENT TRAINING: CPT

## 2025-05-28 PROCEDURE — 36415 COLL VENOUS BLD VENIPUNCTURE: CPT | Performed by: STUDENT IN AN ORGANIZED HEALTH CARE EDUCATION/TRAINING PROGRAM

## 2025-05-28 PROCEDURE — 85025 COMPLETE CBC W/AUTO DIFF WBC: CPT

## 2025-05-28 PROCEDURE — 83735 ASSAY OF MAGNESIUM: CPT | Performed by: STUDENT IN AN ORGANIZED HEALTH CARE EDUCATION/TRAINING PROGRAM

## 2025-05-28 PROCEDURE — 80053 COMPREHEN METABOLIC PANEL: CPT | Performed by: STUDENT IN AN ORGANIZED HEALTH CARE EDUCATION/TRAINING PROGRAM

## 2025-05-28 PROCEDURE — 36415 COLL VENOUS BLD VENIPUNCTURE: CPT

## 2025-05-28 RX ORDER — HYDROCODONE BITARTRATE AND ACETAMINOPHEN 5; 325 MG/1; MG/1
1 TABLET ORAL EVERY 4 HOURS PRN
Qty: 20 TABLET | Refills: 0 | Status: SHIPPED | OUTPATIENT
Start: 2025-05-28 | End: 2025-05-28

## 2025-05-28 RX ORDER — FERROUS SULFATE 325(65) MG
325 TABLET ORAL
Qty: 30 TABLET | Refills: 0 | Status: SHIPPED | OUTPATIENT
Start: 2025-05-28 | End: 2025-06-27

## 2025-05-28 RX ORDER — HYDROCODONE BITARTRATE AND ACETAMINOPHEN 5; 325 MG/1; MG/1
1 TABLET ORAL EVERY 4 HOURS PRN
Qty: 20 TABLET | Refills: 0 | Status: SHIPPED | OUTPATIENT
Start: 2025-05-28

## 2025-05-28 RX ADMIN — SODIUM CHLORIDE: 9 INJECTION, SOLUTION INTRAVENOUS at 12:05

## 2025-05-28 RX ADMIN — THERA TABS 1 TABLET: TAB at 10:05

## 2025-05-28 RX ADMIN — MUPIROCIN 1 G: 20 OINTMENT TOPICAL at 10:05

## 2025-05-28 RX ADMIN — Medication 1 TABLET: at 10:05

## 2025-05-28 NOTE — PT/OT/SLP PROGRESS
Occupational Therapy   Treatment    Name: Morena Mcclendon  MRN: [id number]  Admitting Diagnosis:  Femoral neck fracture  3 Days Post-Op    Recommendations:     Discharge Recommendations: Moderate Intensity Therapy  Discharge Equipment Recommendations:  to be determined by next level of care  Barriers to discharge:  Other (Comment) (increased physical assistance for ADLs and functional mobility)    Assessment:     Morena Mcclendon is a 89 y.o. female with a medical diagnosis of Femoral neck fracture.  She presents with impaired cognition and required constant redirection during functional tasks.  She is fearful of pain and falling which is limiting her participation. Performance deficits affecting function are weakness, impaired endurance, impaired self care skills, impaired functional mobility, gait instability, impaired balance, impaired cognition, decreased lower extremity function, decreased upper extremity function, decreased safety awareness, pain, impaired cardiopulmonary response to activity, orthopedic precautions.     Rehab Prognosis:  Fair; patient would benefit from acute skilled OT services to address these deficits and reach maximum level of function.       Plan:     Patient to be seen 5 x/week to address the above listed problems via self-care/home management, therapeutic activities, therapeutic exercises  Plan of Care Expires: 06/26/25  Plan of Care Reviewed with: patient    Subjective     Chief Complaint: My leg hurts.  She was rubbing the right leg complaining of pain but her surgical leg was her left leg.    Patient/Family Comments/goals: To return home with her son and daughter in law.   Pain/Comfort:  Pain Rating 1:Unrateable, rubbed legs and complained of pain with sitting.    Objective:     Communicated with: nurse prior to session.  Patient found supine with telemetry, peripheral IV, bed alarm upon OT entry to room.    General Precautions: Standard, fall    Orthopedic Precautions:LLE  weight bearing as tolerated, LLE posterior precautions  Braces: Hip abduction brace  Respiratory Status: Room air     Occupational Performance:     Bed Mobility:    Patient completed Rolling/Turning to Left with  maximal assistance  Patient completed Supine to Sit with maximal assistance.  She had difficulty with scoot to EOB.  She is limited by fear of falling.     Activities of Daily Living:  Grooming: minimum assistance for oral care, face washing and combing hair seated EOB.    She sat EOB with Fair + sitting balance.      [facility] 6 Click ADL: 13    Treatment & Education:  Pt seen for safe self care activities and fx mobility retraining as stated above.  All questions/concerns addressed within scope.  Call staff for BTB/OOB assist. Call bell use explained. Pt acknowledged.  Purpose of OT and POC     Patient left sitting edge of bed with all lines intact, call button in reach, and Physical Therapist Indigo and her Daughter in law present    GOALS:   Multidisciplinary Problems       Occupational Therapy Goals          Problem: Occupational Therapy    Goal Priority Disciplines Outcome Interventions   Occupational Therapy Goal     OT, PT/OT Progressing    Description: Goals to be met by: 6/26/2025     Patient will increase functional independence with ADLs by performing:    UE Dressing with Minimal Assistance.  Grooming while seated with Stand-by Assistance.  Toileting from bedside commode with Minimal Assistance for hygiene and clothing management.   Sitting at edge of bed x10 minutes with Stand-by Assistance.  Supine to sit with Stand-by Assistance.  Toilet transfer to bedside commode with Stand-by Assistance.  Upper extremity exercise program x10 reps per handout, with assistance as needed.                         DME Justifications:  No DME recommended requiring DME justifications    Time Tracking:     OT Date of Treatment: 05/28/25  OT Start Time: 0949  OT Stop Time: 1035  OT Total Time (min): 46 min    Billable  Minutes:Self Care/Home Management 15  Therapeutic Activity 31               5/28/2025

## 2025-05-28 NOTE — NURSING
Latest Reference Range & Units 05/27/25 19:52   Hemoglobin 12.0 - 16.0 gm/dL 7.0 (L)   Hematocrit 37.0 - 48.5 % 22.3 (L)   (L): Data is abnormally low    Notified Dr. Donahue-on call.

## 2025-05-28 NOTE — PLAN OF CARE
CHERYL spoke with Maribel at [facility] regarding patient's discharge. CHERYL provided Maribel with information regarding possible discharge today or tomorrow. Maribel reported she would reach out to the son to complete paperwork for admission. CHERYL will continue to keep Maribel updated with discharge.     CHERYL met with patient's daughter-in-law, Rosalva, at bedside. CHERYL updated Rosalva with the accepting facilities and declines. CHERYL informed Rosalva that [facility] declined due to patient being max assist; however, [facility] and [facility]. Rosalva reported they would be going with [facility]. CHERYL informed Rosalva that Maribel with [facility] would be reaching out to Mr. Brown to complete paperwork.     1140: CHERYL received a phone call from Kevin, son, regarding discharge. Kevin reported he has an appointment with [facility] at 2:30pm to complete paperwork. Kevin reported he agrees to allowing facility to transport at discharge due to his mother's currently weakness. CHERYL informed Kevin that she would keep him updated when report information and transportation is set up.

## 2025-05-28 NOTE — NURSING
IV removed with no difficulty. Catheter intact. Pt discharged off unit in stable condition with discharge instructions and personal belongings with facility transportation.

## 2025-05-28 NOTE — CARE UPDATE
Spoke with the daughter concerning the patient's recent isolated systolic hypertension, offered treatment plans, the daughter wishes to follow up with the patient's primary for further evaluation as she believes this is most probably an acute issue that will resolve.    Pepe Richter NP  05/28/2025  1:07 PM

## 2025-05-28 NOTE — DISCHARGE SUMMARY
[facility]  Discharge Summary      Patient Name: Morena Mcclendon  MRN: [id number]  [facility]: [id number]  Patient Class: IP- Inpatient  Admission Date: 5/24/2025  Hospital Length of Stay: 4 days  Discharge Date and Time: 05/28/2025 9:30 AM  Attending Physician: Gibran Hough MD   Discharging Provider: Pepe Richter NP  Primary Care Provider: Faith Nichole MD    Primary Care Team: Networked reference to record PCT     HPI:   89-year-old female presented to ED for eval s/p fall. pMHx dementia, anemia, colon cancer, cataract, CKD.  Patient is a poor historian as she has dementia, history obtained per ED report, chart review, and son who is at bedside.  It is reported patient fell yesterday evening while walking with home health staff when her legs gave out underneath her, patient was assisted to the ground by staff.  This morning, patient was unable to stand and bear weight to LLE 2/2 pain.  Patient's son states at baseline patient has shuffling gait with small steps and ambulates with assistance.  In ED today, patient found to have left hip fracture.  CT pelvis impression with Acute subcapital left femoral neck fracture with left hip lipohemarthrosis.  L knee XR and L tib/fib XR impressions without acute abnormality.  ED discussed case with ortho, plan is for surgery in a.m. Denies blood thinner use.  Basic labs pending.  Admit to hospital medicine for further eval.      Procedure(s) (LRB):  ARTHROPLASTY, HIP (Left)      Hospital Course:   Patient closely monitored while hospitalized. She presented to the ED with difficulty walking and bearing weight after an assisted fall the day before. Patient underwent a CT pelvis and X-rays and was found to have Acute subcapital left femoral neck fracture with left hip lipohemarthrosis. L knee XR and L tib/fib XR impressions without acute abnormality. Ortho was consulted and planned for surgery the following day. Patient underwent left total  hip arthroplasty with no complications per ortho OP note. Patient Hgb dropped to 6.7 post op day 2 and she received 1 unit PRBC with increase to 7.4. No signs of bleedign noted.  She was however found to be iron deficient and started on iron supplementation.  On admission patient had an ADRIANO which was treated with IV fluid repletion.  She was placed for strengthening at a SNF.     Goals of Care Treatment Preferences:  Code Status: Full Code      SDOH Screening:  The patient declined to be screened for utility difficulties, food insecurity, transport difficulties, housing insecurity, and interpersonal safety, so no concerns could be identified this admission.     Consults:   Consults (From admission, onward)          Status Ordering Provider     Case Management/  Once        Provider:  (Not yet assigned)    Completed ADAN MCINTYRE     Inpatient consult to Orthopedics  Once        Provider:  Adan Mcintyre MD    Acknowledged ADAN MCINTYRE            Final Active Diagnoses:    Diagnosis Date Noted POA    PRINCIPAL PROBLEM:  Femoral neck fracture [S72.009A] 05/24/2025 Yes    Dementia [F03.90] 05/24/2025 Yes    Anemia [D64.9] 05/24/2025 Yes      Problems Resolved During this Admission:    Diagnosis Date Noted Date Resolved POA    Acute on chronic kidney failure [N17.9, N18.9] 03/21/2023 05/28/2025 Yes       Discharged Condition: stable    Disposition: Skilled Nursing Facility    Follow Up:   Contact information for after-discharge care       Destination       [facility] .    Service: Skilled Nursing  Contact information:  [address] [place] [id number]  [phone]                                 Patient Instructions:   No discharge procedures on file.    Significant Diagnostic Studies: N/A    Pending Diagnostic Studies:       Procedure Component Value Units Date/Time    Urinalysis, Reflex to Urine Culture Urine, Clean Catch [[id number]]     Order  Status: Sent Lab Status: No result     Specimen: Urine            Medications:  Reconciled Home Medications:      Medication List        START taking these medications      ferrous sulfate 325 mg (65 mg iron) Tab tablet  Commonly known as: IRON  Take 1 tablet (325 mg total) by mouth daily with breakfast.     HYDROcodone-acetaminophen 5-325 mg per tablet  Commonly known as: NORCO  Take 1 tablet by mouth every 4 (four) hours as needed for Pain.            CHANGE how you take these medications      ibandronate 150 mg tablet  Commonly known as: BONIVA  Take 1 tablet (150 mg total) by mouth every 30 days.  What changed: additional instructions            CONTINUE taking these medications      calcium carbonate-vit D3-min 600 mg-10 mcg (400 unit) Tab  Take 1 tablet by mouth once daily. for 365 doses     multivitamin with minerals tablet  Take 1 tablet by mouth once daily.              Indwelling Lines/Drains at time of discharge:   Lines/Drains/Airways       None                   Time spent on the discharge of patient: 33 minutes         Pepe Richter NP  Department of Hospital Medicine  [facility]

## 2025-05-28 NOTE — PLAN OF CARE
CHERYL provided the report information to floor nurse, Nick, via secure chat (call report to station 1 to Ruddy at 1530). [facility] will be providing transportation. CHERYL spoke to Rosalva, daughter-in-law, [phone] to notify the family that patient was discharging to [facility] today.     05/28/25 1527   Final Note   Assessment Type Final Discharge Note   Anticipated Discharge Disposition SNF   Hospital Resources/Appts/Education Provided Appointments scheduled and added to AVS   Post-Acute Status   Post-Acute Authorization Placement   Post-Acute Placement Status Set-up Complete/Auth obtained   Discharge Delays (!) Ambulance Transport/Facility Transport

## 2025-05-28 NOTE — PHYSICIAN QUERY
Please clarify the nature / etiology of the patient's hematological values:  Iron deficiency anemia

## 2025-05-28 NOTE — PT/OT/SLP PROGRESS
Physical Therapy Treatment    Patient Name:  Morena Mcclendon   MRN:  [id number]    Recommendations:     Discharge Recommendations: Moderate Intensity Therapy  Discharge Equipment Recommendations: to be determined by next level of care  Barriers to discharge: None    Assessment:     Morena Mcclendon is a 89 y.o. female admitted with a medical diagnosis of Femoral neck fracture.  She presents with the following impairments/functional limitations: impaired self care skills, gait instability, impaired functional mobility, impaired cognition, decreased lower extremity function, decreased safety awareness, impaired cardiopulmonary response to activity, decreased ROM .    Rehab Prognosis: Fair; patient would benefit from acute skilled PT services to address these deficits and reach maximum level of function.    Recent Surgery: Procedure(s) (LRB):  ARTHROPLASTY, HIP (Left) 3 Days Post-Op    Plan:     During this hospitalization, patient to be seen 6 x/week to address the identified rehab impairments via gait training, therapeutic activities, therapeutic exercises and progress toward the following goals:    Plan of Care Expires:  06/27/25    Subjective     Chief Complaint:  Was reluctant to receive assistance from therapist  Patient/Family Comments/goals: Did not state  Pain/Comfort:  Pain Rating 1: 0/10      Objective:     Communicated with nurse prior to session.  Patient found sitting edge of bed with telemetry, peripheral IV upon PT entry to room.     General Precautions: Standard, fall  Orthopedic Precautions: LLE weight bearing as tolerated  Braces:    Respiratory Status: Room air     Functional Mobility:  Bed Mobility:     Rolling Left:  maximal assistance  Rolling Right: maximal assistance  Sit to Supine: maximal assistance and of 2 persons  Transfers:     Sit to Stand:  maximal assistance with hand-held assist      AM-PAC 6 CLICK MOBILITY          Treatment & Education:  Functional mobility as indicated  above    Patient left supine with all lines intact, call button in reach, bed alarm on, and daughter present..    GOALS:   Multidisciplinary Problems       Physical Therapy Goals          Problem: Physical Therapy    Goal Priority Disciplines Outcome Interventions   Physical Therapy Goal     PT, PT/OT     Description: Goals to be met by: 2025     Patient will increase functional independence with mobility by performin. Supine to sit with Moderate Assistance  2. Sit to supine with Moderate Assistance  3. Sit to stand transfer with Moderate Assistance  4. Bed to chair transfer with Moderate Assistance using Rolling Walker  5. Gait  x 10  feet with Minimal Assistance x2 using Rolling Walker.                          DME Justifications:  No DME recommended requiring DME justifications    Time Tracking:     PT Received On: 25  PT Start Time: 1035     PT Stop Time: 1050  PT Total Time (min): 15 min     Billable Minutes: Therapeutic Activity 15 minutes    Treatment Type: Treatment  PT/PTA: PT     Number of PTA visits since last PT visit: 0     2025

## 2025-05-28 NOTE — DISCHARGE INSTRUCTIONS
[facility]  Facility Transfer Orders        Admit to:  N[facility]    Diagnoses:   Active Hospital Problems    Diagnosis  POA    *Femoral neck fracture [S72.009A]  Yes    Dementia [F03.90]  Yes    Anemia [D64.9]  Yes      Resolved Hospital Problems    Diagnosis Date Resolved POA    Acute on chronic kidney failure [N17.9, N18.9] 05/28/2025 Yes     Allergies: Review of patient's allergies indicates:  No Known Allergies    Code Status:  Full    Vitals: Routine       Diet: regular diet    Activity: Activity as tolerated    Nursing Precautions: Fall    Bed/Surface: Low Air Loss    Consults: PT to evaluate and treat- treat as appropriate times a week, OT to evaluate and treat- treat as appropriate times a week, and Wound Care      Pending Diagnostic Studies:       Procedure Component Value Units Date/Time    Urinalysis, Reflex to Urine Culture Urine, Clean Catch [[id number]]     Order Status: Sent Lab Status: No result     Specimen: Urine           Medications: Discontinue all previous medication orders, if any. See new list below.  Current Discharge Medication List        START taking these medications    Details   ferrous sulfate (IRON) 325 mg (65 mg iron) Tab tablet Take 1 tablet (325 mg total) by mouth daily with breakfast.  Qty: 30 tablet, Refills: 0      HYDROcodone-acetaminophen (NORCO) 5-325 mg per tablet Take 1 tablet by mouth every 4 (four) hours as needed for Pain.  Qty: 20 tablet, Refills: 0    Comments: Quantity prescribed more than 7 day supply? No  Associated Diagnoses: Left displaced femoral neck fracture           CONTINUE these medications which have NOT CHANGED    Details   calcium carbonate-vit D3-min 600 mg calcium- 400 unit Tab Take 1 tablet by mouth once daily. for 365 doses  Qty: 90 tablet, Refills: 3    Associated Diagnoses: Age-related osteoporosis without current pathological fracture      ibandronate (BONIVA) 150 mg tablet Take 1 tablet (150 mg total) by mouth every 30  days.  Qty: 1 tablet, Refills: 11    Associated Diagnoses: Age-related osteoporosis without current pathological fracture      multivitamin with minerals tablet Take 1 tablet by mouth once daily.    Associated Diagnoses: Memory loss; Diarrhea, unspecified type           Follow up:    Contact information for after-discharge care       Destination       [facility] .    Service: Skilled Nursing  Contact information:  [address] [place] [id number]  [phone]                                     Immunizations Administered as of 5/28/2025       Name Date Dose VIS Date Route Exp Date    COVID-19, MRNA, LN-S, PF (Pfizer) (Purple Cap) 1/19/2022 -- -- Intramuscular --    Site: Right arm     : [employer]     Lot: [id number]     Comment: [employer]     COVID-19, MRNA, LN-S, PF (Pfizer) (Purple Cap) 3/19/2021 0.3 mL -- Intramuscular --    Site: Left deltoid     : [employer]     Lot: [id number]     Comment: [employer]     COVID-19, MRNA, LN-S, PF (Pfizer) (Purple Cap) 2/26/2021 0.3 mL -- Intramuscular --    : [employer]     Lot: [id number]     Comment: [employer] Richter NP

## 2025-05-28 NOTE — PLAN OF CARE
SW sent AVS, TB test, chest x-ray, and PASRR to [facility] via [employer]. Pending report information and transportation.      05/28/25 5970   Post-Acute Status   Post-Acute Authorization Placement   Post-Acute Placement Status Set-up Complete/Auth obtained   Hospital Resources/Appts/Education Provided Appointments scheduled and added to AVS   Discharge Delays (!) Post-Acute Set-up   Discharge Plan   Discharge Plan A Skilled Nursing Facility   Discharge Plan B Skilled Nursing Facility

## 2025-05-28 NOTE — PLAN OF CARE
Imm    05/28/25 0959   Medicare Message   Important Message from Medicare regarding Discharge Appeal Rights Explained to patient/caregiver;Signed/date by patient/caregiver   Date IMM was signed 05/28/25   Time IMM was signed 0915

## 2025-05-29 ENCOUNTER — TELEPHONE (OUTPATIENT)
Dept: ADMINISTRATIVE | Facility: CLINIC | Age: OVER 89
End: 2025-05-29
Payer: MEDICARE

## 2025-06-05 ENCOUNTER — PATIENT OUTREACH (OUTPATIENT)
Facility: HOSPITAL | Age: OVER 89
End: 2025-06-05
Payer: MEDICARE

## 2025-06-12 ENCOUNTER — PATIENT OUTREACH (OUTPATIENT)
Facility: HOSPITAL | Age: OVER 89
End: 2025-06-12
Payer: MEDICARE

## 2025-06-19 ENCOUNTER — PATIENT OUTREACH (OUTPATIENT)
Facility: HOSPITAL | Age: OVER 89
End: 2025-06-19
Payer: MEDICARE

## 2025-06-24 ENCOUNTER — PATIENT OUTREACH (OUTPATIENT)
Facility: HOSPITAL | Age: OVER 89
End: 2025-06-24
Payer: MEDICARE

## 2025-09-05 ENCOUNTER — NURSE TRIAGE (OUTPATIENT)
Dept: ADMINISTRATIVE | Facility: CLINIC | Age: OVER 89
End: 2025-09-05
Payer: MEDICARE

## (undated) DEVICE — GLOVE SENSICARE PI SURG 8.5

## (undated) DEVICE — ADHESIVE MASTISOL VIAL 48/BX

## (undated) DEVICE — Device

## (undated) DEVICE — SOL NACL IRR 1000ML BTL

## (undated) DEVICE — SOL NACL IRR 3000ML

## (undated) DEVICE — BLADE SGTL XTK LNG 25X78.5X1.5

## (undated) DEVICE — SUT CTD VICRYL 1 UND BR

## (undated) DEVICE — COVER CAMERA OPERATING ROOM

## (undated) DEVICE — SUT MONOCRYL PLUS UD 3-0 27

## (undated) DEVICE — CLOSURE SKIN STERI STRIP 1/2X4

## (undated) DEVICE — GLOVE SENSICARE PI GRN 8.5

## (undated) DEVICE — COVER LIGHT HANDLE 80/CA

## (undated) DEVICE — SUT STRATAFIX SPIRAL VIOLET

## (undated) DEVICE — PAD ABDOMINAL STERILE 5X9IN

## (undated) DEVICE — DRAPE SURG U SLOT 47X70 LONG

## (undated) DEVICE — HANDPIECE INTERPULSE SET

## (undated) DEVICE — DRAPE SPLIT ADHESIVE 77X120IN

## (undated) DEVICE — SEALER AQUAMANTYS 3.48MM

## (undated) DEVICE — DRAPE U SPLIT SHEET 54X76IN

## (undated) DEVICE — TRAY TOTAL HIP SMH

## (undated) DEVICE — TOGA FLYTE PEEL AWAY XLARGE

## (undated) DEVICE — SUT FIBERWIRE 2 38 IN TAPER

## (undated) DEVICE — CONNECTOR TUBING STR 5 IN 1

## (undated) DEVICE — ELECTRODE BLD EXT 6.50 ST DISP